# Patient Record
Sex: FEMALE | Race: WHITE | NOT HISPANIC OR LATINO | Employment: FULL TIME | URBAN - METROPOLITAN AREA
[De-identification: names, ages, dates, MRNs, and addresses within clinical notes are randomized per-mention and may not be internally consistent; named-entity substitution may affect disease eponyms.]

---

## 2023-04-18 ENCOUNTER — OFFICE VISIT (OUTPATIENT)
Dept: PRIMARY CARE | Facility: CLINIC | Age: 28
End: 2023-04-18
Payer: COMMERCIAL

## 2023-04-18 VITALS
TEMPERATURE: 98 F | HEART RATE: 86 BPM | BODY MASS INDEX: 34.09 KG/M2 | SYSTOLIC BLOOD PRESSURE: 135 MMHG | WEIGHT: 212.1 LBS | DIASTOLIC BLOOD PRESSURE: 86 MMHG | HEIGHT: 66 IN

## 2023-04-18 DIAGNOSIS — I10 HYPERTENSION, UNSPECIFIED TYPE: ICD-10-CM

## 2023-04-18 DIAGNOSIS — Z00.00 PHYSICAL EXAM: Primary | ICD-10-CM

## 2023-04-18 PROCEDURE — 1036F TOBACCO NON-USER: CPT | Performed by: NURSE PRACTITIONER

## 2023-04-18 PROCEDURE — 3075F SYST BP GE 130 - 139MM HG: CPT | Performed by: NURSE PRACTITIONER

## 2023-04-18 PROCEDURE — 99395 PREV VISIT EST AGE 18-39: CPT | Performed by: NURSE PRACTITIONER

## 2023-04-18 PROCEDURE — 3079F DIAST BP 80-89 MM HG: CPT | Performed by: NURSE PRACTITIONER

## 2023-04-18 RX ORDER — HYDROCHLOROTHIAZIDE 25 MG/1
25 TABLET ORAL
COMMUNITY
Start: 2023-03-15 | End: 2023-04-18 | Stop reason: SDUPTHER

## 2023-04-18 RX ORDER — HYDROCHLOROTHIAZIDE 25 MG/1
25 TABLET ORAL
Qty: 90 TABLET | Refills: 1 | Status: SHIPPED | OUTPATIENT
Start: 2023-04-18 | End: 2023-10-09

## 2023-04-18 ASSESSMENT — PATIENT HEALTH QUESTIONNAIRE - PHQ9
SUM OF ALL RESPONSES TO PHQ9 QUESTIONS 1 AND 2: 0
2. FEELING DOWN, DEPRESSED OR HOPELESS: NOT AT ALL
1. LITTLE INTEREST OR PLEASURE IN DOING THINGS: NOT AT ALL

## 2023-04-18 NOTE — PROGRESS NOTES
"Subjective   Patient ID: Yael Govea is a 27 y.o. female who presents for Hypertension ([facility] care - discuss BP).    HPI   Patient presents to clinic to establish care.  Patient is a nurse at [employer].      She tells me she was last seen by an online PCP approximately 1 month ago who started her on hydrochlorothiazide for her blood pressure.  She stated blood pressures have been ranging systolic 150-160s.  She states when she started on a blood pressure medication they range in the 130s.         She tells me generally she has been feeling well-She is being treated for plantar fasciitis receiving injections from podiatry.  States she continues to have some pain in the left foot.  She is doing some treatments at home as well.    Appetite normal bowel and bladder normal    Review of Systems   Cardiovascular:  Negative for chest pain.   Musculoskeletal:         See HPI   Neurological:  Negative for dizziness and headaches.   All other systems reviewed and are negative.      Objective   /86 (BP Location: Left arm, Patient Position: Sitting, BP Cuff Size: Large adult)   Pulse 86   Temp 36.7 °C (98 °F) (Temporal)   Ht 1.676 m (5' 6\")   Wt 96.2 kg (212 lb 1.6 oz)   BMI 34.23 kg/m²     Physical Exam  Constitutional:       Appearance: Normal appearance.   HENT:      Right Ear: Tympanic membrane and external ear normal.      Left Ear: Tympanic membrane and external ear normal.      Mouth/Throat:      Mouth: Mucous membranes are moist.   Eyes:      Pupils: Pupils are equal, round, and reactive to light.   Cardiovascular:      Rate and Rhythm: Normal rate and regular rhythm.   Pulmonary:      Effort: Pulmonary effort is normal.      Breath sounds: Normal breath sounds.   Abdominal:      General: Bowel sounds are normal.      Palpations: Abdomen is soft.   Musculoskeletal:         General: Normal range of motion.      Cervical back: Normal range of motion and neck supple.   Skin:     General: Skin is warm and " dry.   Neurological:      Mental Status: She is alert and oriented to person, place, and time.   Psychiatric:         Mood and Affect: Mood normal.         Assessment/Plan   Problem List Items Addressed This Visit    None  Visit Diagnoses       Physical exam    -  Primary    Relevant Medications        Other Relevant Orders    CBC    Comprehensive Metabolic Panel    Lipid Panel    Hemoglobin A1C    Tsh With Reflex To Free T4 If Abnormal    Vitamin D 25-Hydroxy,Total    Hypertension, unspecified type               hydroCHLOROthiazide (HYDRODiuril) 25 mg tablet          Continue to check Blood pressures at home.         Healthy low sodium diet

## 2023-04-18 NOTE — PATIENT INSTRUCTIONS
Continue medications as prescribed.  Healthy diet and drink plenty of water.  Please have labs drawn.  My CHART for results will call with abnormal results only.  Please schedule all necessary health screenings ophthalmology and dentist.    Follow-up in 1 YEAR  Call office any new concerns.

## 2023-04-19 ASSESSMENT — ENCOUNTER SYMPTOMS
HEADACHES: 0
DIZZINESS: 0

## 2023-04-28 ENCOUNTER — LAB (OUTPATIENT)
Dept: LAB | Facility: LAB | Age: 28
End: 2023-04-28
Payer: COMMERCIAL

## 2023-04-28 DIAGNOSIS — Z00.00 PHYSICAL EXAM: ICD-10-CM

## 2023-04-28 LAB
ALANINE AMINOTRANSFERASE (SGPT) (U/L) IN SER/PLAS: 13 U/L (ref 7–45)
ALBUMIN (G/DL) IN SER/PLAS: 4.2 G/DL (ref 3.4–5)
ALKALINE PHOSPHATASE (U/L) IN SER/PLAS: 68 U/L (ref 33–110)
ANION GAP IN SER/PLAS: 12 MMOL/L (ref 10–20)
ASPARTATE AMINOTRANSFERASE (SGOT) (U/L) IN SER/PLAS: 11 U/L (ref 9–39)
BILIRUBIN TOTAL (MG/DL) IN SER/PLAS: 0.5 MG/DL (ref 0–1.2)
CALCIDIOL (25 OH VITAMIN D3) (NG/ML) IN SER/PLAS: 35 NG/ML
CALCIUM (MG/DL) IN SER/PLAS: 9 MG/DL (ref 8.6–10.3)
CARBON DIOXIDE, TOTAL (MMOL/L) IN SER/PLAS: 27 MMOL/L (ref 21–32)
CHLORIDE (MMOL/L) IN SER/PLAS: 104 MMOL/L (ref 98–107)
CHOLESTEROL (MG/DL) IN SER/PLAS: 178 MG/DL (ref 0–199)
CHOLESTEROL IN HDL (MG/DL) IN SER/PLAS: 41.4 MG/DL
CHOLESTEROL/HDL RATIO: 4.3
CREATININE (MG/DL) IN SER/PLAS: 0.78 MG/DL (ref 0.5–1.05)
ERYTHROCYTE DISTRIBUTION WIDTH (RATIO) BY AUTOMATED COUNT: 12.2 % (ref 11.5–14.5)
ERYTHROCYTE MEAN CORPUSCULAR HEMOGLOBIN CONCENTRATION (G/DL) BY AUTOMATED: 31.3 G/DL (ref 32–36)
ERYTHROCYTE MEAN CORPUSCULAR VOLUME (FL) BY AUTOMATED COUNT: 83 FL (ref 80–100)
ERYTHROCYTES (10*6/UL) IN BLOOD BY AUTOMATED COUNT: 5.39 X10E12/L (ref 4–5.2)
ESTIMATED AVERAGE GLUCOSE FOR HBA1C: 100 MG/DL
GFR FEMALE: >90 ML/MIN/1.73M2
GLUCOSE (MG/DL) IN SER/PLAS: 80 MG/DL (ref 74–99)
HEMATOCRIT (%) IN BLOOD BY AUTOMATED COUNT: 44.7 % (ref 36–46)
HEMOGLOBIN (G/DL) IN BLOOD: 14 G/DL (ref 12–16)
HEMOGLOBIN A1C/HEMOGLOBIN TOTAL IN BLOOD: 5.1 %
LDL: 109 MG/DL (ref 0–99)
LEUKOCYTES (10*3/UL) IN BLOOD BY AUTOMATED COUNT: 11.2 X10E9/L (ref 4.4–11.3)
NRBC (PER 100 WBCS) BY AUTOMATED COUNT: 0 /100 WBC (ref 0–0)
PLATELETS (10*3/UL) IN BLOOD AUTOMATED COUNT: 315 X10E9/L (ref 150–450)
POTASSIUM (MMOL/L) IN SER/PLAS: 4 MMOL/L (ref 3.5–5.3)
PROTEIN TOTAL: 7.5 G/DL (ref 6.4–8.2)
SODIUM (MMOL/L) IN SER/PLAS: 139 MMOL/L (ref 136–145)
THYROTROPIN (MIU/L) IN SER/PLAS BY DETECTION LIMIT <= 0.05 MIU/L: 5.37 MIU/L (ref 0.44–3.98)
THYROXINE (T4) FREE (NG/DL) IN SER/PLAS: 1.02 NG/DL (ref 0.61–1.12)
TRIGLYCERIDE (MG/DL) IN SER/PLAS: 137 MG/DL (ref 0–149)
UREA NITROGEN (MG/DL) IN SER/PLAS: 18 MG/DL (ref 6–23)
VLDL: 27 MG/DL (ref 0–40)

## 2023-04-28 PROCEDURE — 82306 VITAMIN D 25 HYDROXY: CPT

## 2023-04-28 PROCEDURE — 84443 ASSAY THYROID STIM HORMONE: CPT

## 2023-04-28 PROCEDURE — 84439 ASSAY OF FREE THYROXINE: CPT

## 2023-04-28 PROCEDURE — 80061 LIPID PANEL: CPT

## 2023-04-28 PROCEDURE — 36415 COLL VENOUS BLD VENIPUNCTURE: CPT

## 2023-04-28 PROCEDURE — 80053 COMPREHEN METABOLIC PANEL: CPT

## 2023-04-28 PROCEDURE — 85027 COMPLETE CBC AUTOMATED: CPT

## 2023-04-28 PROCEDURE — 83036 HEMOGLOBIN GLYCOSYLATED A1C: CPT

## 2023-05-01 ENCOUNTER — TELEPHONE (OUTPATIENT)
Dept: PRIMARY CARE | Facility: CLINIC | Age: 28
End: 2023-05-01
Payer: COMMERCIAL

## 2023-05-01 DIAGNOSIS — R79.89 ELEVATED TSH: Primary | ICD-10-CM

## 2023-05-01 NOTE — TELEPHONE ENCOUNTER
Phoned patient to discuss lab results.  TSH level elevated at 5.37 T4 is normal.  We will recheck level in 6 weeks.  Also cholesterol level lipid panel mildly elevated advised healthy lifestyle changes with diet exercises.

## 2023-06-13 ENCOUNTER — LAB (OUTPATIENT)
Dept: LAB | Facility: LAB | Age: 28
End: 2023-06-13
Payer: COMMERCIAL

## 2023-06-13 DIAGNOSIS — R79.89 ELEVATED TSH: ICD-10-CM

## 2023-06-13 LAB — THYROTROPIN (MIU/L) IN SER/PLAS BY DETECTION LIMIT <= 0.05 MIU/L: 3.44 MIU/L (ref 0.44–3.98)

## 2023-06-13 PROCEDURE — 36415 COLL VENOUS BLD VENIPUNCTURE: CPT

## 2023-06-13 PROCEDURE — 84443 ASSAY THYROID STIM HORMONE: CPT

## 2023-06-14 ENCOUNTER — TELEPHONE (OUTPATIENT)
Dept: PRIMARY CARE | Facility: CLINIC | Age: 28
End: 2023-06-14
Payer: COMMERCIAL

## 2023-06-14 NOTE — TELEPHONE ENCOUNTER
----- Message from KIM Lozoya sent at 6/14/2023  9:32 AM EDT -----  Regarding: Lab  Please notify patient that TSH level is back within normal range.  ----- Message -----  From: Lab, Background User  Sent: 6/13/2023   4:03 PM EDT  To: KIM Lozoya

## 2023-09-26 ENCOUNTER — LAB (OUTPATIENT)
Dept: LAB | Facility: LAB | Age: 28
End: 2023-09-26
Payer: COMMERCIAL

## 2023-09-26 LAB — TOBACCO SCREEN, URINE: NEGATIVE

## 2023-11-30 ENCOUNTER — LAB REQUISITION (OUTPATIENT)
Dept: LAB | Facility: HOSPITAL | Age: 28
End: 2023-11-30
Payer: COMMERCIAL

## 2023-11-30 DIAGNOSIS — U07.1 COVID-19: ICD-10-CM

## 2023-11-30 LAB — SARS-COV-2 RNA RESP QL NAA+PROBE: NOT DETECTED

## 2023-11-30 PROCEDURE — 87635 SARS-COV-2 COVID-19 AMP PRB: CPT

## 2024-03-08 ENCOUNTER — OFFICE VISIT (OUTPATIENT)
Dept: OBSTETRICS AND GYNECOLOGY | Facility: CLINIC | Age: 29
End: 2024-03-08
Payer: COMMERCIAL

## 2024-03-08 VITALS
HEIGHT: 66 IN | BODY MASS INDEX: 33.91 KG/M2 | WEIGHT: 211 LBS | DIASTOLIC BLOOD PRESSURE: 88 MMHG | SYSTOLIC BLOOD PRESSURE: 136 MMHG

## 2024-03-08 DIAGNOSIS — Z31.9 DESIRE FOR PREGNANCY: ICD-10-CM

## 2024-03-08 DIAGNOSIS — Z01.419 ENCOUNTER FOR ANNUAL ROUTINE GYNECOLOGICAL EXAMINATION: Primary | ICD-10-CM

## 2024-03-08 DIAGNOSIS — Z12.4 CERVICAL CANCER SCREENING: ICD-10-CM

## 2024-03-08 PROCEDURE — 88175 CYTOPATH C/V AUTO FLUID REDO: CPT

## 2024-03-08 PROCEDURE — 87661 TRICHOMONAS VAGINALIS AMPLIF: CPT

## 2024-03-08 PROCEDURE — 99385 PREV VISIT NEW AGE 18-39: CPT | Performed by: OBSTETRICS & GYNECOLOGY

## 2024-03-08 PROCEDURE — 1036F TOBACCO NON-USER: CPT | Performed by: OBSTETRICS & GYNECOLOGY

## 2024-03-08 PROCEDURE — 87800 DETECT AGNT MULT DNA DIREC: CPT

## 2024-03-08 ASSESSMENT — PAIN SCALES - GENERAL: PAINLEVEL: 0-NO PAIN

## 2024-03-08 NOTE — PROGRESS NOTES
"Assessment     PLAN  1. Encounter for annual routine gynecological examination    2. Cervical cancer screening  - THINPREP PAP TEST    3. Desire for pregnancy  - trying to conceive for about 6 months now. Seems ovulatory. No red flags for infertility. discussed timed intercourse. Discussed referral to [facility] at 12 months if not successful.     Please return for your next visit in 1 year or sooner as needed.    Abena Green MD      Subjective     Yael Schmidt is a 28 y.o. female who is here for a routine exam.   PCP = Evie Resendiz, APRN-CNP    APE Concerns: Trying to conceive since 10/2024, +OPK    History of chronic HTN - on hydrochlorothiazide in the past. It was discontinued a few years ago due to low normal Bps. She checks BP daily at home and reports that they are always normal.     Gynecologic History:    OBHx: G0  Menses: regular, heavy, duration 5 days  Last Pap: reports normal about 3 years ago  HPV Vaccine: completed series  History of Dysplasia: Denies  Sexually active: active with   STI testing: accepts GCT  Contraception: Stopped OCP 3/2023  Mammogram: Not indicated  FamHx of GYN cancers:  Denies      PMH, PSH, FH, SH, medications and allergies reviewed and edited as needed.      Objective   /88   Ht 1.676 m (5' 6\")   Wt 95.7 kg (211 lb)   LMP 02/18/2024   BMI 34.06 kg/m²      General:   Alert and oriented, in no acute distress   Neck: Supple. No visible thyromegaly.    Breast/Axilla: Normal to palpation bilaterally without masses, skin changes, or nipple discharge.    Abdomen: Soft, non-tender, without masses or organomegaly   Vulva: Normal architecture without erythema, masses, or lesions.    Vagina: Normal mucosa without lesions, masses, or atrophy. No abnormal vaginal discharge.    Cervix: Normal without masses, lesions, or signs of cervicitis.    Uterus: Normal mobile, non-enlarged uterus    Adnexa: Normal without masses or lesions   Pelvic Floor No POP " noted. No high tone pelvic floor   Psych Normal affect. Normal mood.

## 2024-03-08 NOTE — PROGRESS NOTES
New Patient Visit. Pt states she is having bleeding since stopping OCPs. Have been actively trying to conceive within the last year    LAST PAP: Per pt ( 3 yrs ago ) WNL    LAST MAMM: N/A

## 2024-03-12 LAB
C TRACH RRNA SPEC QL NAA+PROBE: NEGATIVE
N GONORRHOEA DNA SPEC QL PROBE+SIG AMP: NEGATIVE
T VAGINALIS RRNA SPEC QL NAA+PROBE: NEGATIVE

## 2024-03-21 LAB
CYTOLOGY CMNT CVX/VAG CYTO-IMP: NORMAL
LAB AP HPV GENOTYPE QUESTION: NO
LAB AP HPV HR: NORMAL
LAB AP PAP ADDITIONAL TESTS: NORMAL
LABORATORY COMMENT REPORT: NORMAL
LMP START DATE: NORMAL
PATH REPORT.TOTAL CANCER: NORMAL

## 2024-06-11 ENCOUNTER — TELEPHONE (OUTPATIENT)
Dept: PRIMARY CARE | Facility: CLINIC | Age: 29
End: 2024-06-11
Payer: COMMERCIAL

## 2024-06-11 NOTE — TELEPHONE ENCOUNTER
Evie Resendiz is no longer in Family medicine Patient needs to schedule with a new PCP to move forward with the request. Please call [phone] or the scheduling line @ [phone] .

## 2024-06-11 NOTE — TELEPHONE ENCOUNTER
----- Message from Mariya Luo RN sent at 6/11/2024 10:46 AM EDT -----  Regarding: FW: Medication request  Contact: [phone]  Are you able to assist this pt with her request?     Thank you!   ----- Message -----  From: Tere Bates  Sent: 6/11/2024  10:44 AM EDT  To: DAPHNE Lozoya-CNP; #  Subject: FW: Medication request                             ----- Message -----  From: Yael Schmidt  Sent: 6/9/2024  12:06 PM EDT  To: [facility] Rn Care Coordinator  Subject: Medication request                               Good morning,     I wanted to reach out and see if you think a semiglutide would be an option that you recommend for weight management. 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     Let me know if this is something you have experience with or if you think I should see someone that specializes in weight management

## 2024-06-11 NOTE — LETTER
June 11, 2024      Good morning, Yael Schmidt unfortunately Evie Resendiz is no longer in Family medicine Patient needs to schedule with a new PCP to move forward with the request. Please call [phone] or the scheduling line @ [phone]       Thank you,      MORA NORTON LPN

## 2024-07-26 ENCOUNTER — TELEPHONE (OUTPATIENT)
Dept: OBSTETRICS AND GYNECOLOGY | Facility: CLINIC | Age: 29
End: 2024-07-26
Payer: COMMERCIAL

## 2024-07-26 NOTE — TELEPHONE ENCOUNTER
Called patient. Patient sent a [employer]ost[employer] message stating she had a positive pregnancy test. Patient LMP was July 5th. Patient scheduled for a new ob appointment. Patient educated on concerning symptoms to watch out for/when to call the office. Patient has no questions or concerns at this time.

## 2024-08-05 ENCOUNTER — APPOINTMENT (OUTPATIENT)
Dept: OBSTETRICS AND GYNECOLOGY | Facility: CLINIC | Age: 29
End: 2024-08-05
Payer: COMMERCIAL

## 2024-08-22 ENCOUNTER — APPOINTMENT (OUTPATIENT)
Dept: OBSTETRICS AND GYNECOLOGY | Facility: CLINIC | Age: 29
End: 2024-08-22
Payer: COMMERCIAL

## 2024-08-30 ENCOUNTER — APPOINTMENT (OUTPATIENT)
Dept: OBSTETRICS AND GYNECOLOGY | Facility: CLINIC | Age: 29
End: 2024-08-30
Payer: COMMERCIAL

## 2024-08-30 ENCOUNTER — LAB (OUTPATIENT)
Dept: LAB | Facility: LAB | Age: 29
End: 2024-08-30
Payer: COMMERCIAL

## 2024-08-30 VITALS — BODY MASS INDEX: 34.38 KG/M2 | DIASTOLIC BLOOD PRESSURE: 78 MMHG | SYSTOLIC BLOOD PRESSURE: 104 MMHG | WEIGHT: 213 LBS

## 2024-08-30 DIAGNOSIS — Z3A.09 9 WEEKS GESTATION OF PREGNANCY (HHS-HCC): Primary | ICD-10-CM

## 2024-08-30 DIAGNOSIS — Z3A.09 9 WEEKS GESTATION OF PREGNANCY (HHS-HCC): ICD-10-CM

## 2024-08-30 DIAGNOSIS — O10.919 CHRONIC HYPERTENSION IN PREGNANCY (HHS-HCC): ICD-10-CM

## 2024-08-30 LAB
ABO GROUP (TYPE) IN BLOOD: NORMAL
ALBUMIN SERPL BCP-MCNC: 3.8 G/DL (ref 3.4–5)
ALP SERPL-CCNC: 63 U/L (ref 33–110)
ALT SERPL W P-5'-P-CCNC: 11 U/L (ref 7–45)
ANION GAP SERPL CALC-SCNC: 15 MMOL/L (ref 10–20)
ANTIBODY SCREEN: NORMAL
AST SERPL W P-5'-P-CCNC: 9 U/L (ref 9–39)
BILIRUB SERPL-MCNC: 0.4 MG/DL (ref 0–1.2)
BUN SERPL-MCNC: 10 MG/DL (ref 6–23)
CALCIUM SERPL-MCNC: 8.8 MG/DL (ref 8.6–10.6)
CHLORIDE SERPL-SCNC: 103 MMOL/L (ref 98–107)
CO2 SERPL-SCNC: 22 MMOL/L (ref 21–32)
CREAT SERPL-MCNC: 0.57 MG/DL (ref 0.5–1.05)
CREAT UR-MCNC: 114.2 MG/DL (ref 20–320)
EGFRCR SERPLBLD CKD-EPI 2021: >90 ML/MIN/1.73M*2
ERYTHROCYTE [DISTWIDTH] IN BLOOD BY AUTOMATED COUNT: 12.5 % (ref 11.5–14.5)
EST. AVERAGE GLUCOSE BLD GHB EST-MCNC: 94 MG/DL
GLUCOSE SERPL-MCNC: 74 MG/DL (ref 74–99)
HBA1C MFR BLD: 4.9 %
HBV CORE AB SER QL: NONREACTIVE
HBV SURFACE AB SER-ACNC: <3.1 MIU/ML
HBV SURFACE AG SERPL QL IA: NONREACTIVE
HCT VFR BLD AUTO: 40.5 % (ref 36–46)
HCV AB SER QL: NONREACTIVE
HGB BLD-MCNC: 13 G/DL (ref 12–16)
HIV 1+2 AB+HIV1 P24 AG SERPL QL IA: NONREACTIVE
LDH SERPL L TO P-CCNC: 73 U/L (ref 84–246)
MCH RBC QN AUTO: 26.2 PG (ref 26–34)
MCHC RBC AUTO-ENTMCNC: 32.1 G/DL (ref 32–36)
MCV RBC AUTO: 82 FL (ref 80–100)
NRBC BLD-RTO: 0 /100 WBCS (ref 0–0)
PLATELET # BLD AUTO: 325 X10*3/UL (ref 150–450)
POTASSIUM SERPL-SCNC: 4 MMOL/L (ref 3.5–5.3)
PROT SERPL-MCNC: 7.5 G/DL (ref 6.4–8.2)
PROT UR-ACNC: 7 MG/DL (ref 5–24)
PROT/CREAT UR: 0.06 MG/MG CREAT (ref 0–0.17)
RBC # BLD AUTO: 4.97 X10*6/UL (ref 4–5.2)
REFLEX ADDED, ANEMIA PANEL: NORMAL
RH FACTOR (ANTIGEN D): NORMAL
RUBV IGG SERPL IA-ACNC: 1.3 IA
RUBV IGG SERPL QL IA: POSITIVE
SODIUM SERPL-SCNC: 136 MMOL/L (ref 136–145)
TREPONEMA PALLIDUM IGG+IGM AB [PRESENCE] IN SERUM OR PLASMA BY IMMUNOASSAY: NONREACTIVE
URATE SERPL-MCNC: 5.4 MG/DL (ref 2.3–6.7)
WBC # BLD AUTO: 10.6 X10*3/UL (ref 4.4–11.3)

## 2024-08-30 PROCEDURE — 86850 RBC ANTIBODY SCREEN: CPT

## 2024-08-30 PROCEDURE — 84550 ASSAY OF BLOOD/URIC ACID: CPT

## 2024-08-30 PROCEDURE — 87086 URINE CULTURE/COLONY COUNT: CPT

## 2024-08-30 PROCEDURE — 87340 HEPATITIS B SURFACE AG IA: CPT

## 2024-08-30 PROCEDURE — 86803 HEPATITIS C AB TEST: CPT

## 2024-08-30 PROCEDURE — 86780 TREPONEMA PALLIDUM: CPT

## 2024-08-30 PROCEDURE — 86900 BLOOD TYPING SEROLOGIC ABO: CPT

## 2024-08-30 PROCEDURE — 81329 SMN1 GENE DOS/DELETION ALYS: CPT

## 2024-08-30 PROCEDURE — 81220 CFTR GENE COM VARIANTS: CPT

## 2024-08-30 PROCEDURE — G0452 MOLECULAR PATHOLOGY INTERPR: HCPCS | Performed by: PATHOLOGY

## 2024-08-30 PROCEDURE — 87591 N.GONORRHOEAE DNA AMP PROB: CPT

## 2024-08-30 PROCEDURE — 86704 HEP B CORE ANTIBODY TOTAL: CPT

## 2024-08-30 PROCEDURE — 80053 COMPREHEN METABOLIC PANEL: CPT

## 2024-08-30 PROCEDURE — 87491 CHLMYD TRACH DNA AMP PROBE: CPT

## 2024-08-30 PROCEDURE — G0452 MOLECULAR PATHOLOGY INTERPR: HCPCS | Performed by: OBSTETRICS & GYNECOLOGY

## 2024-08-30 PROCEDURE — 83036 HEMOGLOBIN GLYCOSYLATED A1C: CPT

## 2024-08-30 PROCEDURE — 86901 BLOOD TYPING SEROLOGIC RH(D): CPT

## 2024-08-30 PROCEDURE — 0500F INITIAL PRENATAL CARE VISIT: CPT | Performed by: OBSTETRICS & GYNECOLOGY

## 2024-08-30 PROCEDURE — 86317 IMMUNOASSAY INFECTIOUS AGENT: CPT

## 2024-08-30 PROCEDURE — 83615 LACTATE (LD) (LDH) ENZYME: CPT

## 2024-08-30 PROCEDURE — 81243 FMR1 GEN ALY DETC ABNL ALLEL: CPT

## 2024-08-30 PROCEDURE — 84156 ASSAY OF PROTEIN URINE: CPT

## 2024-08-30 PROCEDURE — 36415 COLL VENOUS BLD VENIPUNCTURE: CPT

## 2024-08-30 PROCEDURE — 83021 HEMOGLOBIN CHROMOTOGRAPHY: CPT

## 2024-08-30 PROCEDURE — 85027 COMPLETE CBC AUTOMATED: CPT

## 2024-08-30 PROCEDURE — 87389 HIV-1 AG W/HIV-1&-2 AB AG IA: CPT

## 2024-08-30 PROCEDURE — 83020 HEMOGLOBIN ELECTROPHORESIS: CPT | Performed by: OBSTETRICS & GYNECOLOGY

## 2024-08-30 PROCEDURE — 86706 HEP B SURFACE ANTIBODY: CPT

## 2024-08-30 PROCEDURE — 82570 ASSAY OF URINE CREATININE: CPT

## 2024-08-30 ASSESSMENT — EDINBURGH POSTNATAL DEPRESSION SCALE (EPDS)
I HAVE FELT SAD OR MISERABLE: NO, NOT AT ALL
I HAVE FELT SCARED OR PANICKY FOR NO GOOD REASON: NO, NOT AT ALL
THINGS HAVE BEEN GETTING ON TOP OF ME: NO, I HAVE BEEN COPING AS WELL AS EVER
I HAVE BEEN ABLE TO LAUGH AND SEE THE FUNNY SIDE OF THINGS: AS MUCH AS I ALWAYS COULD
I HAVE BEEN ANXIOUS OR WORRIED FOR NO GOOD REASON: NO, NOT AT ALL
I HAVE LOOKED FORWARD WITH ENJOYMENT TO THINGS: AS MUCH AS I EVER DID
I HAVE BEEN SO UNHAPPY THAT I HAVE BEEN CRYING: NO, NEVER
I HAVE BLAMED MYSELF UNNECESSARILY WHEN THINGS WENT WRONG: NO, NEVER
I HAVE BEEN SO UNHAPPY THAT I HAVE HAD DIFFICULTY SLEEPING: NOT AT ALL
TOTAL SCORE: 0
THE THOUGHT OF HARMING MYSELF HAS OCCURRED TO ME: NEVER

## 2024-08-30 NOTE — PROGRESS NOTES
NEW OB VISIT    Assessment/Plan    Problem List Items Addressed This Visit       9 weeks gestation of pregnancy ([facility]) - Primary    Overview     Desired provider in labor: [] CNM  [x] Physician  [x] Blood Products: [x] Yes, accepts [] No, needs counseling  [x] Initial BMI: Could not be calculated   [] Prenatal Labs: ordered  [x] Cervical Cancer Screening up to date  [] Rh status:   [] Genetic Screening:  desires CF/SMA/fragile X and cfDNA  [] NT US: (11-13 wks)  [] Baby ASA (if indicated): Indicated, remind to start at NV  [] Pregnancy dated by:     [] Anatomy US: (19-20 wks)  [] Federal Sterilization consent signed (if indicated):  [] 1hr GCT at 24-28wks:  [] Rhogam (if indicated):   [] Fetal Surveillance (if indicated):  [] Tdap (27-32 wks, may be given up to 36 wks if initial window missed):   [] RSV (32-36 wks) (Sept. to end of Jan):   [] Flu Vaccine:    [] Breastfeeding:  [] Postpartum Birth control method:   [] GBS at 36 - 37 wks:  [] 39 weeks discussion of IOL vs. Expectant management:  [] Mode of delivery ( anticipated ):           Relevant Orders    C. Trachomatis / N. Gonorrhoeae, Amplified Detection    CBC Anemia Panel With Reflex,Pregnancy    Hemoglobin A1C    Hemoglobin Identification with Path Review    Hepatitis B Core Antibody, Total    Hepatitis B surface Ag    Hepatitis B Surface Antibody    Hepatitis C Antibody    HIV 1/2 Antigen/Antibody Screen with Reflex to Confirmation    Rubella IgG    Syphilis Screen with Reflex    Type And Screen    Urine culture    Cystic Fibrosis Carrier Screening    Fragile X Analysis    Spinal Muscular Atrophy Carrier Screening    Myriad Prequel Prenatal Screen    US MAC OB imaging order    Comprehensive Metabolic Panel    Lactate Dehydrogenase    Protein, Urine Random    Uric Acid    Chronic hypertension in pregnancy ([facility])    Overview     Not on meds  Monitoring Bps at home.   Baseline PEC labs ordered           Routine care  -Prenatal labs ordered, dating  ultrasound ordered  -OB education provided. Oriented to  OB/GYN practice  -Recommended vaccines (COVID-19, flu).  -Clinical risk assessment for preeclampsia: high risk, ASA indicated     Genetic counseling  -Patient was counseled regarding risks and benefits of serum screening for genetic disorders. After discussion, patient desires CF/SMA/Fragile X  -Patient was also counseled about options for Down's syndrome and other aneuploidy screening. After discussion, patient desires cf DNA  -NT discussed. Advised anatomy ultrasound at 18-20 weeks.     Pregnancy: Rodriguez     Delivery Plans  Planned delivery location: [facility]  Acceptable blood products: All     Post-Delivery Plans  Feeding intentions: plans to breastfeed     Follow up in 4 weeks or sooner as needed    Abena Green MD        Subjective     Yael Schmidt is a 29 y.o.  at 9w0d by LMP who presents for an initial prenatal visit.     Previously on hydrochlorothiazide for cHTN. Only on it for a few weeks and Bps low so discontinued. 2 years ago. Checks Bps at home    Nausea, rare vomiting    OB Hx: First pregnancy  Past Medical Hx: cHTN  Past Surgical Hx: Removal of wisdom teeth  Social Hx: Denies tobacco, alcohol, drugs  Family Hx: Non contributory, works in [employer] OR  Medications: PNV  Allergies: Doxycycline (GI upset)  Pap Hx: NILM 3/2024 (<31yo), repeat in 3 years      Physical Exam  Objective   /78   Wt 96.6 kg (213 lb)   LMP 2024 (Approximate)   BMI 34.38 kg/m²      General:   Alert and oriented, in no acute distress

## 2024-08-31 LAB
C TRACH RRNA SPEC QL NAA+PROBE: NEGATIVE
N GONORRHOEA DNA SPEC QL PROBE+SIG AMP: NEGATIVE

## 2024-09-01 LAB — BACTERIA UR CULT: NO GROWTH

## 2024-09-03 LAB
HEMOGLOBIN A2: 2.7 % (ref 2–3.5)
HEMOGLOBIN A: 96.9 % (ref 95.8–98)
HEMOGLOBIN F: 0.4 % (ref 0–2)
HEMOGLOBIN IDENTIFICATION INTERPRETATION: NORMAL
PATH REVIEW-HGB IDENTIFICATION: NORMAL

## 2024-09-06 LAB
ELECTRONICALLY SIGNED BY: NORMAL
SMA RESULT: NORMAL

## 2024-09-10 ENCOUNTER — LAB (OUTPATIENT)
Dept: LAB | Facility: LAB | Age: 29
End: 2024-09-10
Payer: COMMERCIAL

## 2024-09-10 LAB
ELECTRONICALLY SIGNED BY: NORMAL
FRAGILE X INTERPRETATION: NORMAL
FRAGILE X RESULT: NORMAL

## 2024-09-11 LAB
CFTR MUT ANL BLD/T: NORMAL
ELECTRONICALLY SIGNED BY: NORMAL

## 2024-09-16 ENCOUNTER — TELEPHONE (OUTPATIENT)
Dept: OBSTETRICS AND GYNECOLOGY | Facility: HOSPITAL | Age: 29
End: 2024-09-16
Payer: COMMERCIAL

## 2024-09-17 ENCOUNTER — HOSPITAL ENCOUNTER (OUTPATIENT)
Dept: RADIOLOGY | Facility: CLINIC | Age: 29
Discharge: HOME | End: 2024-09-17
Payer: COMMERCIAL

## 2024-09-17 DIAGNOSIS — Z3A.11 11 WEEKS GESTATION OF PREGNANCY (HHS-HCC): ICD-10-CM

## 2024-09-17 DIAGNOSIS — Z3A.09 9 WEEKS GESTATION OF PREGNANCY (HHS-HCC): ICD-10-CM

## 2024-09-17 DIAGNOSIS — O26.21 RECURRENT PREGNANCY LOSS IN PREGNANT PATIENT IN FIRST TRIMESTER, ANTEPARTUM (HHS-HCC): Primary | ICD-10-CM

## 2024-09-17 PROCEDURE — 76801 OB US < 14 WKS SINGLE FETUS: CPT | Performed by: OBSTETRICS & GYNECOLOGY

## 2024-09-17 PROCEDURE — 76801 OB US < 14 WKS SINGLE FETUS: CPT

## 2024-09-17 NOTE — TELEPHONE ENCOUNTER
Called today with two spots of blood on the toilet paper, about half of a sheet of toilet paper on two occasions in the last hour. Some cramping. Discussed she could come to the ED, or be seen tomorrow morning. Patient would like to be seen in the morning.

## 2024-09-18 ENCOUNTER — TELEPHONE (OUTPATIENT)
Dept: OBSTETRICS AND GYNECOLOGY | Facility: CLINIC | Age: 29
End: 2024-09-18
Payer: COMMERCIAL

## 2024-09-18 NOTE — TELEPHONE ENCOUNTER
Called patient to follow up on bleeding. Patient denies bleeding today and abdominal cramping. Patient stated she has pain but it feels more like a pulling. Patient educated/informed to call the office if any concerning symptoms arise. Patient verbalized understanding.  ----- Message from Laverne Ring sent at 2024 12:19 AM EDT -----  Regardin week with bleeding  This patient had some bleeding overnight. I would like her to get an ultrasound today if possible.

## 2024-09-21 LAB
COMMENTS - MP RESULT TYPE: NORMAL
SCAN RESULT: NORMAL

## 2024-09-27 ENCOUNTER — HOSPITAL ENCOUNTER (OUTPATIENT)
Dept: RADIOLOGY | Facility: CLINIC | Age: 29
Discharge: HOME | End: 2024-09-27
Payer: COMMERCIAL

## 2024-09-27 ENCOUNTER — APPOINTMENT (OUTPATIENT)
Dept: OBSTETRICS AND GYNECOLOGY | Facility: CLINIC | Age: 29
End: 2024-09-27
Payer: COMMERCIAL

## 2024-09-27 ENCOUNTER — APPOINTMENT (OUTPATIENT)
Dept: RADIOLOGY | Facility: CLINIC | Age: 29
End: 2024-09-27
Payer: COMMERCIAL

## 2024-09-27 VITALS — BODY MASS INDEX: 34.7 KG/M2 | DIASTOLIC BLOOD PRESSURE: 84 MMHG | SYSTOLIC BLOOD PRESSURE: 122 MMHG | WEIGHT: 215 LBS

## 2024-09-27 DIAGNOSIS — Z3A.09 9 WEEKS GESTATION OF PREGNANCY (HHS-HCC): ICD-10-CM

## 2024-09-27 DIAGNOSIS — O10.919 CHRONIC HYPERTENSION IN PREGNANCY (HHS-HCC): ICD-10-CM

## 2024-09-27 DIAGNOSIS — Z3A.13 13 WEEKS GESTATION OF PREGNANCY (HHS-HCC): ICD-10-CM

## 2024-09-27 PROCEDURE — 76816 OB US FOLLOW-UP PER FETUS: CPT

## 2024-09-27 NOTE — PROGRESS NOTES
Ob Follow-up  24     SUBJECTIVE      HPI: Yael Schmidt is a 29 y.o.  at 13w0d here for RPNV.    Patient had an episode of bleeding that lasted about 2 days. She went in for an urgent ultrasound that was normal. Bleeding has resolved.   Patient reports increased nose bleedings - using vaseline. Sacral soreness       OBJECTIVE  Visit Vitals  /84   Wt 97.5 kg (215 lb)   LMP 2024 (Approximate)   BMI 34.70 kg/m²   OB Status Pregnant   Smoking Status Never   BSA 2.13 m²          ASSESSMENT & PLAN    Yael Schmidt is a 29 y.o.  at 13w0d here for the following concerns we addressed today:    Problem List Items Addressed This Visit       13 weeks gestation of pregnancy ([facility])    Overview     Desired provider in labor: [] CNM  [x] Physician  [x] Blood Products: [x] Yes, accepts [] No, needs counseling  [] Initial BMI:   [x] Prenatal Labs: O+, RI, Hep B non immune (Declines vaccine), Hgb 13.0  [x] Cervical Cancer Screening up to date  [x] Rh status: positive  [x] Genetic Screening:  neg CF/SMA/fragile X and rr cfDNA  [] NT US: (11-13 wks): scheduled later today   [x] Baby ASA (if indicated): Indicated, started   [x] Pregnancy dated by: LMP consistend with an 11 week  ultrasound    [] Anatomy US: (19-20 wks)  [] Federal Sterilization consent signed (if indicated):  [] 1hr GCT at 24-28wks:  [] Rhogam (if indicated):   [] Fetal Surveillance (if indicated):  [] Tdap (27-32 wks, may be given up to 36 wks if initial window missed):   [] RSV (32-36 wks) (Sept. to end of ):   [x] Flu Vaccine: discussed 24 and declines    [] Breastfeeding:  [] Postpartum Birth control method:   [] GBS at 36 - 37 wks:  [] 39 weeks discussion of IOL vs. Expectant management:  [] Mode of delivery ( anticipated ):           Chronic hypertension in pregnancy ([facility])    Overview     Not on meds  Monitoring Bps at home   Baseline PEC labs normal              RTC in 4 weeks      Abena Green MD

## 2024-10-21 ENCOUNTER — TELEPHONE (OUTPATIENT)
Dept: OBSTETRICS AND GYNECOLOGY | Facility: CLINIC | Age: 29
End: 2024-10-21
Payer: COMMERCIAL

## 2024-10-21 NOTE — TELEPHONE ENCOUNTER
Called patient. Patient current symptoms are cough and unable to speak. Patient does not have taste or smell but has been eating small meals and protein shakes.  Patient would like something to help with the cough. Patient advised she can take robitussin.     Discussed with patient when to call the office if symptoms worsening or patient has concerning symptoms. Patient verbalized understanding.

## 2024-10-24 ENCOUNTER — APPOINTMENT (OUTPATIENT)
Dept: OBSTETRICS AND GYNECOLOGY | Facility: CLINIC | Age: 29
End: 2024-10-24
Payer: COMMERCIAL

## 2024-10-24 VITALS — SYSTOLIC BLOOD PRESSURE: 132 MMHG | BODY MASS INDEX: 34.22 KG/M2 | DIASTOLIC BLOOD PRESSURE: 76 MMHG | WEIGHT: 212 LBS

## 2024-10-24 DIAGNOSIS — O10.919 CHRONIC HYPERTENSION IN PREGNANCY (HHS-HCC): ICD-10-CM

## 2024-10-24 DIAGNOSIS — J01.90 ACUTE SINUSITIS, RECURRENCE NOT SPECIFIED, UNSPECIFIED LOCATION: ICD-10-CM

## 2024-10-24 DIAGNOSIS — O09.90 SUPERVISION OF HIGH RISK PREGNANCY, ANTEPARTUM (HHS-HCC): Primary | ICD-10-CM

## 2024-10-24 DIAGNOSIS — Z3A.16 16 WEEKS GESTATION OF PREGNANCY (HHS-HCC): ICD-10-CM

## 2024-10-24 PROCEDURE — 0501F PRENATAL FLOW SHEET: CPT | Performed by: OBSTETRICS & GYNECOLOGY

## 2024-10-24 RX ORDER — AMOXICILLIN 500 MG/1
500 TABLET, FILM COATED ORAL 2 TIMES DAILY
Qty: 14 TABLET | Refills: 0 | Status: SHIPPED | OUTPATIENT
Start: 2024-10-24 | End: 2024-10-31

## 2024-10-24 NOTE — PROGRESS NOTES
Ob Follow-up  10/24/24     SUBJECTIVE      HPI: Yael Schmidt is a 29 y.o.  at 16w6d here for RPNV.  Patient     Has been sick with URI symptoms for greater than 10 days. Pain over sinuses. Really miserable at night.    OBJECTIVE  Visit Vitals  /76   Wt 96.2 kg (212 lb)   LMP 2024 (Approximate)   BMI 34.22 kg/m²   OB Status Pregnant   Smoking Status Never   BSA 2.12 m²       ASSESSMENT & PLAN    Yael Schmidt is a 29 y.o.  at 16w6d here for the following concerns we addressed today:    Problem List Items Addressed This Visit       16 weeks gestation of pregnancy ([facility])    Overview     Desired provider in labor: [] CNM  [x] Physician  [x] Blood Products: [x] Yes, accepts [] No, needs counseling  [x] Initial BMI: 34  [x] Prenatal Labs: O+, RI, Hep B non immune (Declines vaccine), Hgb 13.0  [x] Cervical Cancer Screening up to date  [x] Rh status: positive  [x] Genetic Screening:  neg CF/SMA/fragile X and rr cfDNA  [x] NT US: (11-13 wks): no optimal views but no obvious abnormality  [x] Baby ASA (if indicated): Indicated, started   [x] Pregnancy dated by: LMP consistend with an 11 week  ultrasound    [] Anatomy US: (19-20 wks): scheduled  [] Federal Sterilization consent signed (if indicated):  [] 1hr GCT at 24-28wks:  [x] Rhogam (if indicated): Not indicated  [] Fetal Surveillance (if indicated):  [] Tdap (27-32 wks, may be given up to 36 wks if initial window missed):   [] RSV (32-36 wks) (Sept. to end of ):   [x] Flu Vaccine: discussed 24 and declines    [] Breastfeeding:  [] Postpartum Birth control method:   [] GBS at 36 - 37 wks:  [] 39 weeks discussion of IOL vs. Expectant management:  [] Mode of delivery ( anticipated ):           Chronic hypertension in pregnancy ([facility])    Overview     Not on meds  Monitoring Bps at home   Baseline PEC labs normal         Supervision of high risk pregnancy, antepartum ([facility]) - Primary    Acute sinusitis    Overview      Symptoms with minimal improvement after 10 days  Rx sent for amoxicillin         Relevant Medications    amoxicillin (Amoxil) 500 mg tablet       RTC in 4 weeks      Abena Green MD

## 2024-11-15 ENCOUNTER — APPOINTMENT (OUTPATIENT)
Dept: RADIOLOGY | Facility: CLINIC | Age: 29
End: 2024-11-15
Payer: COMMERCIAL

## 2024-11-22 ENCOUNTER — APPOINTMENT (OUTPATIENT)
Dept: OBSTETRICS AND GYNECOLOGY | Facility: CLINIC | Age: 29
End: 2024-11-22
Payer: COMMERCIAL

## 2024-11-22 ENCOUNTER — HOSPITAL ENCOUNTER (OUTPATIENT)
Dept: RADIOLOGY | Facility: CLINIC | Age: 29
Discharge: HOME | End: 2024-11-22
Payer: COMMERCIAL

## 2024-11-22 VITALS — SYSTOLIC BLOOD PRESSURE: 130 MMHG | BODY MASS INDEX: 34.86 KG/M2 | DIASTOLIC BLOOD PRESSURE: 72 MMHG | WEIGHT: 216 LBS

## 2024-11-22 DIAGNOSIS — O09.90 SUPERVISION OF HIGH RISK PREGNANCY, ANTEPARTUM (HHS-HCC): Primary | ICD-10-CM

## 2024-11-22 DIAGNOSIS — Z3A.11 11 WEEKS GESTATION OF PREGNANCY (HHS-HCC): ICD-10-CM

## 2024-11-22 DIAGNOSIS — Z3A.21 21 WEEKS GESTATION OF PREGNANCY (HHS-HCC): ICD-10-CM

## 2024-11-22 DIAGNOSIS — O10.919 CHRONIC HYPERTENSION IN PREGNANCY (HHS-HCC): ICD-10-CM

## 2024-11-22 PROBLEM — J01.90 ACUTE SINUSITIS: Status: RESOLVED | Noted: 2024-10-24 | Resolved: 2024-11-22

## 2024-11-22 PROCEDURE — 76811 OB US DETAILED SNGL FETUS: CPT

## 2024-11-22 PROCEDURE — 0501F PRENATAL FLOW SHEET: CPT | Performed by: OBSTETRICS & GYNECOLOGY

## 2024-11-22 NOTE — PROGRESS NOTES
Ob Follow-up  24     SUBJECTIVE      HPI: Yael Schmidt is a 29 y.o.  at 21w0d here for RPNV.  She has started to feel fetal movement. Sinus infection resolved. Bps at home normal.     OBJECTIVE  Visit Vitals  /72   Wt 98 kg (216 lb)   LMP 2024 (Approximate)   BMI 34.86 kg/m²   OB Status Pregnant   Smoking Status Never   BSA 2.14 m²            ASSESSMENT & PLAN    Yael Schmidt is a 29 y.o.  at 21w0d here for the following concerns we addressed today:    Problem List Items Addressed This Visit       21 weeks gestation of pregnancy ([facility])    Overview     Desired provider in labor: [] CNM  [x] Physician  [x] Blood Products: [x] Yes, accepts [] No, needs counseling  [x] Initial BMI: 34  [x] Prenatal Labs: O+, RI, Hep B non immune (Declines vaccine), Hgb 13.0  [x] Cervical Cancer Screening up to date  [x] Rh status: positive  [x] Genetic Screening:  neg CF/SMA/fragile X and rr cfDNA  [x] NT US: (11-13 wks): suboptimal views but no obvious abnormality  [x] Baby ASA (if indicated): Indicated, started   [x] Pregnancy dated by: LMP consistend with an 11 week  ultrasound    [] Anatomy US: (19-20 wks): incomplete, scheduled for follow up   [] Federal Sterilization consent signed (if indicated):  [] 1hr GCT at 24-28wks: ordered for NV  [x] Rhogam (if indicated): Not indicated  [] Fetal Surveillance (if indicated):  [] Tdap (27-32 wks, may be given up to 36 wks if initial window missed):   [] RSV (32-36 wks) (Sept. to end of ):   [x] Flu Vaccine: discussed 24 and declines    [] Breastfeeding:  [] Postpartum Birth control method:   [] GBS at 36 - 37 wks:  [] 39 weeks discussion of IOL vs. Expectant management:  [] Mode of delivery ( anticipated ):           Chronic hypertension in pregnancy ([facility])    Overview     Not on meds  Monitoring Bps at home   Baseline PEC labs normal         Supervision of high risk pregnancy, antepartum ([facility]) - Primary    Relevant Orders    CBC  Anemia Panel With Reflex,Pregnancy    Glucose, 1 Hour Screen, Pregnancy    Syphilis Screen with Reflex     RTC in 4 weeks      Abena Green MD

## 2024-12-09 ENCOUNTER — HOSPITAL ENCOUNTER (OUTPATIENT)
Dept: RADIOLOGY | Facility: CLINIC | Age: 29
Discharge: HOME | End: 2024-12-09
Payer: COMMERCIAL

## 2024-12-09 DIAGNOSIS — Z3A.11 11 WEEKS GESTATION OF PREGNANCY (HHS-HCC): ICD-10-CM

## 2024-12-09 PROCEDURE — 76815 OB US LIMITED FETUS(S): CPT

## 2024-12-09 PROCEDURE — 76815 OB US LIMITED FETUS(S): CPT | Performed by: OBSTETRICS & GYNECOLOGY

## 2024-12-17 ENCOUNTER — TELEPHONE (OUTPATIENT)
Dept: OBSTETRICS AND GYNECOLOGY | Facility: CLINIC | Age: 29
End: 2024-12-17
Payer: COMMERCIAL

## 2024-12-19 ENCOUNTER — APPOINTMENT (OUTPATIENT)
Dept: OBSTETRICS AND GYNECOLOGY | Facility: CLINIC | Age: 29
End: 2024-12-19
Payer: COMMERCIAL

## 2024-12-19 ENCOUNTER — LAB (OUTPATIENT)
Dept: LAB | Facility: LAB | Age: 29
End: 2024-12-19
Payer: COMMERCIAL

## 2024-12-19 VITALS — DIASTOLIC BLOOD PRESSURE: 68 MMHG | SYSTOLIC BLOOD PRESSURE: 130 MMHG | BODY MASS INDEX: 35.02 KG/M2 | WEIGHT: 217 LBS

## 2024-12-19 DIAGNOSIS — Z3A.24 24 WEEKS GESTATION OF PREGNANCY (HHS-HCC): ICD-10-CM

## 2024-12-19 DIAGNOSIS — O09.90 SUPERVISION OF HIGH RISK PREGNANCY, ANTEPARTUM (HHS-HCC): ICD-10-CM

## 2024-12-19 DIAGNOSIS — O09.90 SUPERVISION OF HIGH RISK PREGNANCY, ANTEPARTUM (HHS-HCC): Primary | ICD-10-CM

## 2024-12-19 DIAGNOSIS — O10.919 CHRONIC HYPERTENSION IN PREGNANCY (HHS-HCC): ICD-10-CM

## 2024-12-19 LAB
ERYTHROCYTE [DISTWIDTH] IN BLOOD BY AUTOMATED COUNT: 13.4 % (ref 11.5–14.5)
GLUCOSE 1H P 50 G GLC PO SERPL-MCNC: 107 MG/DL
HCT VFR BLD AUTO: 35.5 % (ref 36–46)
HGB BLD-MCNC: 11.8 G/DL (ref 12–16)
MCH RBC QN AUTO: 26.8 PG (ref 26–34)
MCHC RBC AUTO-ENTMCNC: 33.2 G/DL (ref 32–36)
MCV RBC AUTO: 81 FL (ref 80–100)
NRBC BLD-RTO: 0 /100 WBCS (ref 0–0)
PLATELET # BLD AUTO: 275 X10*3/UL (ref 150–450)
RBC # BLD AUTO: 4.4 X10*6/UL (ref 4–5.2)
REFLEX ADDED, ANEMIA PANEL: NORMAL
WBC # BLD AUTO: 14.4 X10*3/UL (ref 4.4–11.3)

## 2024-12-19 PROCEDURE — 86780 TREPONEMA PALLIDUM: CPT

## 2024-12-19 PROCEDURE — 85027 COMPLETE CBC AUTOMATED: CPT

## 2024-12-19 PROCEDURE — 0501F PRENATAL FLOW SHEET: CPT | Performed by: OBSTETRICS & GYNECOLOGY

## 2024-12-19 PROCEDURE — 36415 COLL VENOUS BLD VENIPUNCTURE: CPT

## 2024-12-19 PROCEDURE — 82947 ASSAY GLUCOSE BLOOD QUANT: CPT

## 2024-12-19 NOTE — PROGRESS NOTES
Ob Follow-up  24     SUBJECTIVE    HPI: Yael Schmidt is a 29 y.o.  at 24w6d here for RPNV.  She has no contractions, bleeding, or LOF. Reports normal fetal movement. Patient reports left sided pelvic pain/discomfort. It comes and goes. Denies pain today. Denies bleeding, LOF, fevers.     OBJECTIVE  Visit Vitals  /68   Wt 98.4 kg (217 lb)   LMP 2024 (Approximate)   BMI 35.02 kg/m²   OB Status Pregnant   Smoking Status Never   BSA 2.14 m²        ASSESSMENT & PLAN    Yael Schmidt is a 29 y.o.  at 24w6d here for the following concerns we addressed today:    Problem List Items Addressed This Visit       24 weeks gestation of pregnancy ([facility])    Overview     Desired provider in labor: [] CNM  [x] Physician  [x] Blood Products: [x] Yes, accepts [] No, needs counseling  [x] Initial BMI: 34  [x] Prenatal Labs: O+, RI, Hep B non immune (Declines vaccine), Hgb 13.0  [x] Cervical Cancer Screening up to date  [x] Rh status: positive  [x] Genetic Screening:  neg CF/SMA/fragile X and rr cfDNA  [x] NT US: (11-13 wks): suboptimal views but no obvious abnormality  [x] Baby ASA (if indicated): Indicated, started   [x] Pregnancy dated by: LMP consistend with an 11 week  ultrasound    [] Anatomy US: (19-20 wks): incomplete on second attempt, scheduled for follow up   [] Federal Sterilization consent signed (if indicated):  [] 1hr GCT at 24-28wks: pending from   [x] Rhogam (if indicated): Not indicated  [] Fetal Surveillance (if indicated):  [] Tdap (27-32 wks, may be given up to 36 wks if initial window missed): unsure, will consider for NV  [] RSV (32-36 wks) (Sept. to end of ): Likely declines  [x] Flu Vaccine: discussed 24 and declines    [x] Breastfeeding: planning to bf, has spectra pump  [] Postpartum Birth control method:   [] GBS at 36 - 37 wks:  [] 39 weeks discussion of IOL vs. Expectant management:  [x] Mode of delivery ( anticipated ):            Chronic  hypertension in pregnancy ([facility])    Overview     Not on meds  Monitoring Bps at home   Baseline PEC labs normal         Supervision of high risk pregnancy, antepartum ([facility]) - Primary       RTC in 4 weeks      Abena Green MD

## 2024-12-20 LAB — TREPONEMA PALLIDUM IGG+IGM AB [PRESENCE] IN SERUM OR PLASMA BY IMMUNOASSAY: NONREACTIVE

## 2025-01-09 ENCOUNTER — HOSPITAL ENCOUNTER (OUTPATIENT)
Dept: RADIOLOGY | Facility: CLINIC | Age: 30
Discharge: HOME | End: 2025-01-09
Payer: COMMERCIAL

## 2025-01-09 ENCOUNTER — APPOINTMENT (OUTPATIENT)
Dept: OBSTETRICS AND GYNECOLOGY | Facility: CLINIC | Age: 30
End: 2025-01-09
Payer: COMMERCIAL

## 2025-01-09 VITALS — BODY MASS INDEX: 35.99 KG/M2 | SYSTOLIC BLOOD PRESSURE: 126 MMHG | WEIGHT: 223 LBS | DIASTOLIC BLOOD PRESSURE: 78 MMHG

## 2025-01-09 DIAGNOSIS — O10.919 CHRONIC HYPERTENSION IN PREGNANCY (HHS-HCC): Primary | ICD-10-CM

## 2025-01-09 DIAGNOSIS — Z3A.27 27 WEEKS GESTATION OF PREGNANCY (HHS-HCC): ICD-10-CM

## 2025-01-09 DIAGNOSIS — Z03.74 ENCOUNTER FOR SUSPECTED PROBLEM WITH FETAL GROWTH RULED OUT: ICD-10-CM

## 2025-01-09 DIAGNOSIS — O99.210 OBESITY AFFECTING PREGNANCY (HHS-HCC): ICD-10-CM

## 2025-01-09 DIAGNOSIS — Z3A.09 9 WEEKS GESTATION OF PREGNANCY (HHS-HCC): ICD-10-CM

## 2025-01-09 PROCEDURE — 76816 OB US FOLLOW-UP PER FETUS: CPT | Performed by: STUDENT IN AN ORGANIZED HEALTH CARE EDUCATION/TRAINING PROGRAM

## 2025-01-09 PROCEDURE — 76816 OB US FOLLOW-UP PER FETUS: CPT

## 2025-01-09 PROCEDURE — 0501F PRENATAL FLOW SHEET: CPT | Performed by: OBSTETRICS & GYNECOLOGY

## 2025-01-09 NOTE — PROGRESS NOTES
Ob Follow-up  25     SUBJECTIVE      HPI: Yael Schmidt is a 29 y.o.  at 27w6d here for RPNV.  She has no contractions, bleeding, or LOF. Reports normal fetal movement. Patient reports some pitting edema in her anterior calves. No calf pain. No edema in feet or ankles.       OBJECTIVE  Visit Vitals  /78   Wt 101 kg (223 lb)   LMP 2024 (Approximate)   BMI 35.99 kg/m²   OB Status Pregnant   Smoking Status Never   BSA 2.17 m²        ASSESSMENT & PLAN    Yael Schmidt is a 29 y.o.  at 27w6d here for the following concerns we addressed today:    Problem List Items Addressed This Visit          Pregnancy    Chronic hypertension in pregnancy ([facility]) - Primary    Overview     Not on meds  Monitoring Bps at home   Baseline PEC labs normal         24 weeks gestation of pregnancy ([facility])    Overview     Desired provider in labor: [] CNM  [x] Physician  [x] Blood Products: [x] Yes, accepts [] No, needs counseling  [x] Initial BMI: 34  [x] Prenatal Labs: O+, RI, Hep B non immune (Declines vaccine), Hgb 13.0  [x] Cervical Cancer Screening up to date  [x] Rh status: positive  [x] Genetic Screening:  neg CF/SMA/fragile X and rr cfDNA  [x] NT US: (11-13 wks): suboptimal views but no obvious abnormality  [x] Baby ASA (if indicated): Indicated, started   [x] Pregnancy dated by: LMP consistend with an 11 week  ultrasound    [] Anatomy US: (19-20 wks): incomplete on second attempt, scheduled for follow up   [] Federal Sterilization consent signed (if indicated):  [x] 1hr GCT at 24-28wks: normal at 107  [x] Rhogam (if indicated): Not indicated  [] Fetal Surveillance (if indicated):  [] Tdap (27-32 wks, may be given up to 36 wks if initial window missed): unsure, will consider for NV  [x] RSV (32-36 wks) (Sept. to end of ): Declines  [x] Flu Vaccine: discussed 24 and declines    [x] Breastfeeding: planning to bf, has spectra pump  [] Postpartum Birth control method:   [] GBS at 36 - 37  wks:  [] 39 weeks discussion of IOL vs. Expectant management:  [x] Mode of delivery ( anticipated ):                 No orders of the defined types were placed in this encounter.       RTC in 2 weeks      Abena Green MD

## 2025-01-23 ENCOUNTER — APPOINTMENT (OUTPATIENT)
Dept: OBSTETRICS AND GYNECOLOGY | Facility: CLINIC | Age: 30
End: 2025-01-23
Payer: COMMERCIAL

## 2025-01-23 VITALS — SYSTOLIC BLOOD PRESSURE: 120 MMHG | WEIGHT: 222 LBS | BODY MASS INDEX: 35.83 KG/M2 | DIASTOLIC BLOOD PRESSURE: 70 MMHG

## 2025-01-23 DIAGNOSIS — O10.919 CHRONIC HYPERTENSION IN PREGNANCY (HHS-HCC): Primary | ICD-10-CM

## 2025-01-23 DIAGNOSIS — Z3A.29 29 WEEKS GESTATION OF PREGNANCY (HHS-HCC): ICD-10-CM

## 2025-01-23 PROCEDURE — 0501F PRENATAL FLOW SHEET: CPT | Performed by: OBSTETRICS & GYNECOLOGY

## 2025-01-23 NOTE — PROGRESS NOTES
Ob Follow-up  25     SUBJECTIVE      HPI: Yael Schmidt is a 29 y.o.  at 29w6d here for RPNV.  She has no contractions, bleeding, or LOF. Reports normal fetal movement. Passed a kidney stone last week. Feeling better.       OBJECTIVE  Visit Vitals  /70   Wt 101 kg (222 lb)   LMP 2024 (Approximate)   BMI 35.83 kg/m²   OB Status Pregnant   Smoking Status Never   BSA 2.17 m²            ASSESSMENT & PLAN    Yael Schmidt is a 29 y.o.  at 29w6d here for the following concerns we addressed today:    Problem List Items Addressed This Visit          Pregnancy    Chronic hypertension in pregnancy ([facility]) - Primary    Overview     Not on meds  Monitoring Bps at home   Baseline PEC labs normal         29 weeks gestation of pregnancy ([facility])    Overview     Desired provider in labor: [] CNM  [x] Physician  [x] Blood Products: [x] Yes, accepts [] No, needs counseling  [x] Initial BMI: 34  [x] Prenatal Labs: O+, RI, Hep B non immune (Declines vaccine), Hgb 13.0  [x] Cervical Cancer Screening up to date  [x] Rh status: positive  [x] Genetic Screening:  neg CF/SMA/fragile X and rr cfDNA  [x] NT US: (11-13 wks): suboptimal views but no obvious abnormality  [x] Baby ASA (if indicated): Indicated, started   [x] Pregnancy dated by: LMP consistend with an 11 week  ultrasound    [x] Anatomy US: (19-20 wks): completed, normal  [x] Federal Sterilization consent signed (if indicated): N/A  [x] 1hr GCT at 24-28wks: normal at 107  [x] Rhogam (if indicated): Not indicated  [] Fetal Surveillance (if indicated):  [] Tdap (27-32 wks, may be given up to 36 wks if initial window missed): accepts for NV  [x] RSV (32-36 wks) (Sept. to end of ): Declines  [x] Flu Vaccine: discussed 24 and declines    [x] Breastfeeding: planning to bf, has spectra pump  [] Postpartum Birth control method: Discussed options, will consider  [] GBS at 36 - 37 wks:  [] 39 weeks discussion of IOL vs. Expectant  management: discussed 39 week IOL for HTN and she will consider.   [x] Mode of delivery ( anticipated ):                 No orders of the defined types were placed in this encounter.    RTC in 2 weeks      Abena Green MD

## 2025-02-07 ENCOUNTER — APPOINTMENT (OUTPATIENT)
Dept: OBSTETRICS AND GYNECOLOGY | Facility: CLINIC | Age: 30
End: 2025-02-07
Payer: COMMERCIAL

## 2025-02-07 VITALS — WEIGHT: 226 LBS | SYSTOLIC BLOOD PRESSURE: 136 MMHG | DIASTOLIC BLOOD PRESSURE: 60 MMHG | BODY MASS INDEX: 36.48 KG/M2

## 2025-02-07 DIAGNOSIS — Z3A.32 32 WEEKS GESTATION OF PREGNANCY (HHS-HCC): ICD-10-CM

## 2025-02-07 DIAGNOSIS — Z71.85 IMMUNIZATION COUNSELING: ICD-10-CM

## 2025-02-07 DIAGNOSIS — Z23 NEED FOR TDAP VACCINATION: ICD-10-CM

## 2025-02-07 DIAGNOSIS — O10.919 CHRONIC HYPERTENSION IN PREGNANCY (HHS-HCC): Primary | ICD-10-CM

## 2025-02-07 PROCEDURE — 0501F PRENATAL FLOW SHEET: CPT | Performed by: OBSTETRICS & GYNECOLOGY

## 2025-02-07 NOTE — PROGRESS NOTES
Ob Follow-up  25     SUBJECTIVE      HPI: Yael Schmidt is a 29 y.o.  at 32w0d here for RPNV.  She has no contractions, bleeding, or LOF. Reports normal fetal movement. Patient reports feeling more intense vaginal pressure. Feels like the baby has descended into the pelvis       OBJECTIVE  Visit Vitals  /60   Wt 103 kg (226 lb)   LMP 2024 (Approximate)   BMI 36.48 kg/m²   OB Status Pregnant   Smoking Status Never   BSA 2.19 m²            ASSESSMENT & PLAN    Yael Schmidt is a 29 y.o.  at 32w0d here for the following concerns we addressed today:    Problem List Items Addressed This Visit          Pregnancy    Chronic hypertension in pregnancy ([facility]) - Primary    Overview     Not on meds  Monitoring Bps at home   Baseline PEC labs normal             Current Assessment & Plan     Reports normal Bps at home  No symptoms of PEC         32 weeks gestation of pregnancy ([facility])    Overview     Desired provider in labor: [] CNM  [x] Physician  [x] Blood Products: [x] Yes, accepts [] No, needs counseling  [x] Initial BMI: 34  [x] Prenatal Labs: O+, RI, Hep B non immune (Declines vaccine), Hgb 13.0  [x] Cervical Cancer Screening up to date  [x] Rh status: positive  [x] Genetic Screening:  neg CF/SMA/fragile X and rr cfDNA  [x] NT US: (11-13 wks): suboptimal views but no obvious abnormality  [x] Baby ASA (if indicated): Indicated, started   [x] Pregnancy dated by: LMP consistend with an 11 week  ultrasound    [x] Anatomy US: (19-20 wks): completed, normal  [x] Federal Sterilization consent signed (if indicated): N/A  [x] 1hr GCT at 24-28wks: normal at 107  [x] Rhogam (if indicated): Not indicated  [x] Fetal Surveillance (if indicated): Not indicated  [x] Tdap (27-32 wks, may be given up to 36 wks if initial window missed): Declines  [x] RSV (32-36 wks) (Sept. to end of ): Declines  [x] Flu Vaccine: discussed 24 and declines    [x] Breastfeeding: planning to bf, has spectra  pump  [x] Postpartum Birth control method: declines contraception immediately PP, plans for lactational amenorrhea and then possibly IUD vs cOCP in the future  [] GBS at 36 - 37 wks:  [] 39 weeks discussion of IOL vs. Expectant management: cHTN without meds, delivery 38-39 weeks. Discussed and she will consider  [x] Mode of delivery ( anticipated ): , kids in the sun for peds           Current Assessment & Plan     Declines TDAP          Other Visit Diagnoses       Need for Tdap vaccination        Immunization counseling                  No orders of the defined types were placed in this encounter.       RTC in 2 weeks      Abena Green MD

## 2025-02-20 ENCOUNTER — APPOINTMENT (OUTPATIENT)
Dept: OBSTETRICS AND GYNECOLOGY | Facility: CLINIC | Age: 30
End: 2025-02-20
Payer: COMMERCIAL

## 2025-02-20 VITALS — WEIGHT: 227 LBS | DIASTOLIC BLOOD PRESSURE: 68 MMHG | BODY MASS INDEX: 36.64 KG/M2 | SYSTOLIC BLOOD PRESSURE: 108 MMHG

## 2025-02-20 DIAGNOSIS — N93.9 VAGINAL SPOTTING: Primary | ICD-10-CM

## 2025-02-20 DIAGNOSIS — O10.919 CHRONIC HYPERTENSION IN PREGNANCY (HHS-HCC): ICD-10-CM

## 2025-02-20 DIAGNOSIS — Z3A.33 33 WEEKS GESTATION OF PREGNANCY (HHS-HCC): ICD-10-CM

## 2025-02-20 NOTE — PROCEDURES
Yael Schmidt, a  at 33w6d with an MARCO of 2025, by Last Menstrual Period, was seen at [facility] for a nonstress test.    Non-Stress Test   Baseline Fetal Heart Rate for Non-Stress Test: 130 BPM  Variability in Waveform for Non-Stress Test: Moderate  Accelerations in Non-Stress Test: Yes  Decelerations in Non-Stress Test: None  Contractions in Non-Stress Test: Irregular  Interpretation of Non-Stress Test   Interpretation of Non-Stress Test: Reactive

## 2025-02-20 NOTE — PROGRESS NOTES
Ob Follow-up  25     SUBJECTIVE      HPI: Yael Schmidt is a 29 y.o.  at 33w6d here for RPNV.  She has no contractions, or LOF. Reports normal fetal movement. Patient reports noticing vaginal spotting on arrival to the office today. There was a few spots of bright red blood on her TP as well as on her underwear. She also has some slight cramping. Denies recent intercourse.        OBJECTIVE  Visit Vitals  /68   Wt 103 kg (227 lb)   LMP 2024 (Approximate)   BMI 36.64 kg/m²   OB Status Pregnant   Smoking Status Never   BSA 2.19 m²            ASSESSMENT & PLAN    Yael Schmidt is a 29 y.o.  at 33w6d here for the following concerns we addressed today:    Problem List Items Addressed This Visit          Pregnancy    Vaginal spotting - Primary    Overview     No blood noted in vaginal vault on exam  NST reactive  Ok to monitor at home. Present to L&D for persistent/worsening symptoms.         Relevant Orders    Vaginitis Gram Stain For Bacterial Vaginosis + Yeast    Chronic hypertension in pregnancy ([facility])    Overview     Not on meds  Monitoring Bps at home   Baseline PEC labs normal             Current Assessment & Plan     Reports low/normal  Bps at home since last visit  Scheduled for ultrasound next week         33 weeks gestation of pregnancy ([facility])    Overview     Desired provider in labor: [] CNM  [x] Physician  [x] Blood Products: [x] Yes, accepts [] No, needs counseling  [x] Initial BMI: 34  [x] Prenatal Labs: O+, RI, Hep B non immune (Declines vaccine), Hgb 13.0  [x] Cervical Cancer Screening up to date  [x] Rh status: positive  [x] Genetic Screening:  neg CF/SMA/fragile X and rr cfDNA  [x] NT US: (11-13 wks): suboptimal views but no obvious abnormality  [x] Baby ASA (if indicated): Indicated, started   [x] Pregnancy dated by: LMP consistend with an 11 week  ultrasound    [x] Anatomy US: (19-20 wks): completed, normal  [x] Federal Sterilization consent signed (if  indicated): N/A  [x] 1hr GCT at 24-28wks: normal at 107  [x] Rhogam (if indicated): Not indicated  [x] Fetal Surveillance (if indicated): Not indicated  [x] Tdap (27-32 wks, may be given up to 36 wks if initial window missed): Declines  [x] RSV (32-36 wks) (Sept. to end of ): Declines  [x] Flu Vaccine: discussed 24 and declines    [x] Breastfeeding: planning to bf, has spectra pump  [x] Postpartum Birth control method: declines contraception immediately PP, plans for lactational amenorrhea and then possibly IUD vs cOCP in the future  [] GBS at 36 - 37 wks:  [] 39 weeks discussion of IOL vs. Expectant management: cHTN without meds, delivery 38-39 weeks. Discussed and she will consider  [x] Mode of delivery ( anticipated ): , kids in the sun for peds                Orders Placed This Encounter   Procedures    Vaginitis Gram Stain For Bacterial Vaginosis + Yeast     Order Specific Question:   SOURCE:     Answer:   vaginal     Order Specific Question:   [facility]     Answer:   Immediate [1]        RTC in 2 weeks      Abena Green MD

## 2025-02-21 DIAGNOSIS — B37.9 YEAST INFECTION: Primary | ICD-10-CM

## 2025-02-21 LAB — BV SCORE VAG QL: NORMAL

## 2025-02-21 RX ORDER — TERCONAZOLE 4 MG/G
1 CREAM VAGINAL NIGHTLY
Qty: 45 G | Refills: 0 | Status: SHIPPED | OUTPATIENT
Start: 2025-02-21 | End: 2025-02-28

## 2025-02-26 ENCOUNTER — HOSPITAL ENCOUNTER (OUTPATIENT)
Dept: RADIOLOGY | Facility: CLINIC | Age: 30
Discharge: HOME | End: 2025-02-26
Payer: COMMERCIAL

## 2025-02-26 DIAGNOSIS — Z3A.11 11 WEEKS GESTATION OF PREGNANCY (HHS-HCC): ICD-10-CM

## 2025-02-26 PROCEDURE — 76819 FETAL BIOPHYS PROFIL W/O NST: CPT | Performed by: STUDENT IN AN ORGANIZED HEALTH CARE EDUCATION/TRAINING PROGRAM

## 2025-02-26 PROCEDURE — 76816 OB US FOLLOW-UP PER FETUS: CPT | Performed by: STUDENT IN AN ORGANIZED HEALTH CARE EDUCATION/TRAINING PROGRAM

## 2025-02-26 PROCEDURE — 76816 OB US FOLLOW-UP PER FETUS: CPT

## 2025-03-07 ENCOUNTER — APPOINTMENT (OUTPATIENT)
Dept: OBSTETRICS AND GYNECOLOGY | Facility: CLINIC | Age: 30
End: 2025-03-07
Payer: COMMERCIAL

## 2025-03-07 VITALS — BODY MASS INDEX: 37.12 KG/M2 | DIASTOLIC BLOOD PRESSURE: 72 MMHG | WEIGHT: 230 LBS | SYSTOLIC BLOOD PRESSURE: 138 MMHG

## 2025-03-07 DIAGNOSIS — Z3A.36 36 WEEKS GESTATION OF PREGNANCY (HHS-HCC): ICD-10-CM

## 2025-03-07 DIAGNOSIS — O10.919 CHRONIC HYPERTENSION IN PREGNANCY (HHS-HCC): Primary | ICD-10-CM

## 2025-03-07 PROBLEM — N93.9 VAGINAL SPOTTING: Status: RESOLVED | Noted: 2025-02-20 | Resolved: 2025-03-07

## 2025-03-07 PROCEDURE — 0501F PRENATAL FLOW SHEET: CPT | Performed by: OBSTETRICS & GYNECOLOGY

## 2025-03-07 NOTE — ASSESSMENT & PLAN NOTE
Reports normal Bps at home  Reviewed PEC symptoms  Reviewed recommendation for delivery at 38 to 39 weeks. She is hesitant and will consider.

## 2025-03-07 NOTE — PROGRESS NOTES
Ob Follow-up  25     SUBJECTIVE      HPI: Yael Schmidt is a 29 y.o.  at 36w0d here for RPNV.  She has irregular contractions, no bleeding, or LOF. Reports normal fetal movement. Patient reports feeling more vaginal/pelvic pressure       OBJECTIVE  Visit Vitals  /72   Wt 104 kg (230 lb)   LMP 2024 (Approximate)   BMI 37.12 kg/m²   OB Status Pregnant   Smoking Status Never   BSA 2.2 m²      Cervix       ASSESSMENT & PLAN    Yael Schmidt is a 29 y.o.  at 36w0d here for the following concerns we addressed today:    Problem List Items Addressed This Visit          Pregnancy    Chronic hypertension in pregnancy ([facility]) - Primary    Overview     Not on meds  Monitoring Bps at home   Baseline PEC labs normal  Delivery recommended at 38-39 weeks             Current Assessment & Plan     Reports normal Bps at home  Reviewed PEC symptoms  Reviewed recommendation for delivery at 38 to 39 weeks. She is hesitant and will consider.           Relevant Orders    Group B Streptococcus (GBS) Prenatal Screen, Culture    36 weeks gestation of pregnancy ([facility])    Overview     Desired provider in labor: [] CNM  [x] Physician  [x] Blood Products: [x] Yes, accepts [] No, needs counseling  [x] Initial BMI: 34  [x] Prenatal Labs: O+, RI, Hep B non immune (Declines vaccine), Hgb 13.0  [x] Cervical Cancer Screening up to date  [x] Rh status: positive  [x] Genetic Screening:  neg CF/SMA/fragile X and rr cfDNA  [x] NT US: (11-13 wks): suboptimal views but no obvious abnormality  [x] Baby ASA (if indicated): Indicated, started   [x] Pregnancy dated by: LMP consistend with an 11 week  ultrasound    [x] Anatomy US: (19-20 wks): completed, normal  [x] Federal Sterilization consent signed (if indicated): N/A  [x] 1hr GCT at 24-28wks: normal at 107  [x] Rhogam (if indicated): Not indicated  [x] Fetal Surveillance (if indicated): Not indicated  [x] Tdap (27-32 wks, may be given up to 36 wks if  initial window missed): Declines  [x] RSV (32-36 wks) (Sept. to end of ): Declines  [x] Flu Vaccine: discussed 24 and declines    [x] Breastfeeding: planning to bf, has spectra pump  [x] Postpartum Birth control method: declines contraception immediately PP, plans for lactational amenorrhea and then possibly IUD vs cOCP in the future  [] GBS at 36 - 37 wks:  [] 39 weeks discussion of IOL vs. Expectant management: cHTN without meds, delivery 38-39 weeks. Discussed and she will consider  [x] Mode of delivery ( anticipated ): , kids in the sun for peds                Orders Placed This Encounter   Procedures    Group B Streptococcus (GBS) Prenatal Screen, Culture     Order Specific Question:   Release result to [facility]     Answer:   Immediate [1]        RTC in 1 week      Abena Green MD

## 2025-03-09 LAB — GP B STREP SPEC QL CULT: NORMAL

## 2025-03-10 ENCOUNTER — TELEPHONE (OUTPATIENT)
Dept: OBSTETRICS AND GYNECOLOGY | Facility: CLINIC | Age: 30
End: 2025-03-10
Payer: COMMERCIAL

## 2025-03-10 ENCOUNTER — HOSPITAL ENCOUNTER (OUTPATIENT)
Facility: HOSPITAL | Age: 30
Discharge: HOME | End: 2025-03-10
Attending: STUDENT IN AN ORGANIZED HEALTH CARE EDUCATION/TRAINING PROGRAM | Admitting: STUDENT IN AN ORGANIZED HEALTH CARE EDUCATION/TRAINING PROGRAM
Payer: COMMERCIAL

## 2025-03-10 VITALS
DIASTOLIC BLOOD PRESSURE: 65 MMHG | BODY MASS INDEX: 37.34 KG/M2 | SYSTOLIC BLOOD PRESSURE: 141 MMHG | RESPIRATION RATE: 18 BRPM | WEIGHT: 232.37 LBS | OXYGEN SATURATION: 96 % | HEIGHT: 66 IN | HEART RATE: 87 BPM

## 2025-03-10 LAB
ALBUMIN SERPL BCP-MCNC: 3.2 G/DL (ref 3.4–5)
ALP SERPL-CCNC: 113 U/L (ref 33–110)
ALT SERPL W P-5'-P-CCNC: 6 U/L (ref 7–45)
ANION GAP SERPL CALC-SCNC: 13 MMOL/L (ref 10–20)
AST SERPL W P-5'-P-CCNC: 11 U/L (ref 9–39)
BILIRUB SERPL-MCNC: 0.3 MG/DL (ref 0–1.2)
BUN SERPL-MCNC: 10 MG/DL (ref 6–23)
CALCIUM SERPL-MCNC: 8.4 MG/DL (ref 8.6–10.6)
CHLORIDE SERPL-SCNC: 105 MMOL/L (ref 98–107)
CO2 SERPL-SCNC: 23 MMOL/L (ref 21–32)
CREAT SERPL-MCNC: 0.49 MG/DL (ref 0.5–1.05)
CREAT UR-MCNC: 112.3 MG/DL (ref 20–320)
EGFRCR SERPLBLD CKD-EPI 2021: >90 ML/MIN/1.73M*2
ERYTHROCYTE [DISTWIDTH] IN BLOOD BY AUTOMATED COUNT: 13.6 % (ref 11.5–14.5)
GLUCOSE SERPL-MCNC: 76 MG/DL (ref 74–99)
GP B STREP SPEC QL CULT: NORMAL
HCT VFR BLD AUTO: 35 % (ref 36–46)
HGB BLD-MCNC: 11.6 G/DL (ref 12–16)
MCH RBC QN AUTO: 26.3 PG (ref 26–34)
MCHC RBC AUTO-ENTMCNC: 33.1 G/DL (ref 32–36)
MCV RBC AUTO: 79 FL (ref 80–100)
NRBC BLD-RTO: 0 /100 WBCS (ref 0–0)
PLATELET # BLD AUTO: 223 X10*3/UL (ref 150–450)
POTASSIUM SERPL-SCNC: 3.7 MMOL/L (ref 3.5–5.3)
PROT SERPL-MCNC: 6.2 G/DL (ref 6.4–8.2)
PROT UR-ACNC: 13 MG/DL (ref 5–24)
PROT/CREAT UR: 0.12 MG/MG CREAT (ref 0–0.17)
RBC # BLD AUTO: 4.41 X10*6/UL (ref 4–5.2)
SODIUM SERPL-SCNC: 137 MMOL/L (ref 136–145)
WBC # BLD AUTO: 14.1 X10*3/UL (ref 4.4–11.3)

## 2025-03-10 PROCEDURE — 80053 COMPREHEN METABOLIC PANEL: CPT

## 2025-03-10 PROCEDURE — 2500000001 HC RX 250 WO HCPCS SELF ADMINISTERED DRUGS (ALT 637 FOR MEDICARE OP)

## 2025-03-10 PROCEDURE — 59025 FETAL NON-STRESS TEST: CPT

## 2025-03-10 PROCEDURE — 99214 OFFICE O/P EST MOD 30 MIN: CPT

## 2025-03-10 PROCEDURE — 36415 COLL VENOUS BLD VENIPUNCTURE: CPT

## 2025-03-10 PROCEDURE — 85027 COMPLETE CBC AUTOMATED: CPT

## 2025-03-10 PROCEDURE — 84156 ASSAY OF PROTEIN URINE: CPT

## 2025-03-10 PROCEDURE — 99214 OFFICE O/P EST MOD 30 MIN: CPT | Mod: 25

## 2025-03-10 RX ORDER — ASPIRIN 81 MG/1
81 TABLET ORAL DAILY
COMMUNITY

## 2025-03-10 RX ORDER — ONDANSETRON 4 MG/1
4 TABLET, FILM COATED ORAL EVERY 6 HOURS PRN
Status: DISCONTINUED | OUTPATIENT
Start: 2025-03-10 | End: 2025-03-10 | Stop reason: HOSPADM

## 2025-03-10 RX ORDER — METOCLOPRAMIDE 10 MG/1
10 TABLET ORAL ONCE
Status: COMPLETED | OUTPATIENT
Start: 2025-03-10 | End: 2025-03-10

## 2025-03-10 RX ORDER — ONDANSETRON HYDROCHLORIDE 2 MG/ML
4 INJECTION, SOLUTION INTRAVENOUS EVERY 6 HOURS PRN
Status: DISCONTINUED | OUTPATIENT
Start: 2025-03-10 | End: 2025-03-10 | Stop reason: HOSPADM

## 2025-03-10 RX ORDER — ACETAMINOPHEN 325 MG/1
975 TABLET ORAL ONCE
Status: COMPLETED | OUTPATIENT
Start: 2025-03-10 | End: 2025-03-10

## 2025-03-10 RX ORDER — DIPHENHYDRAMINE HCL 25 MG
25 CAPSULE ORAL ONCE
Status: COMPLETED | OUTPATIENT
Start: 2025-03-10 | End: 2025-03-10

## 2025-03-10 RX ADMIN — METOCLOPRAMIDE 10 MG: 10 TABLET ORAL at 15:39

## 2025-03-10 RX ADMIN — ACETAMINOPHEN 975 MG: 325 TABLET ORAL at 14:02

## 2025-03-10 RX ADMIN — DIPHENHYDRAMINE HYDROCHLORIDE 25 MG: 25 CAPSULE ORAL at 15:39

## 2025-03-10 SDOH — SOCIAL STABILITY: SOCIAL INSECURITY: ARE THERE ANY APPARENT SIGNS OF INJURIES/BEHAVIORS THAT COULD BE RELATED TO ABUSE/NEGLECT?: NO

## 2025-03-10 SDOH — SOCIAL STABILITY: SOCIAL INSECURITY: DOES ANYONE TRY TO KEEP YOU FROM HAVING/CONTACTING OTHER FRIENDS OR DOING THINGS OUTSIDE YOUR HOME?: NO

## 2025-03-10 SDOH — SOCIAL STABILITY: SOCIAL INSECURITY: VERBAL ABUSE: DENIES

## 2025-03-10 SDOH — SOCIAL STABILITY: SOCIAL INSECURITY: HAVE YOU HAD THOUGHTS OF HARMING ANYONE ELSE?: NO

## 2025-03-10 SDOH — SOCIAL STABILITY: SOCIAL INSECURITY: ARE YOU OR HAVE YOU BEEN THREATENED OR ABUSED PHYSICALLY, EMOTIONALLY, OR SEXUALLY BY ANYONE?: NO

## 2025-03-10 SDOH — HEALTH STABILITY: MENTAL HEALTH: WERE YOU ABLE TO COMPLETE ALL THE BEHAVIORAL HEALTH SCREENINGS?: YES

## 2025-03-10 SDOH — SOCIAL STABILITY: SOCIAL INSECURITY: HAS ANYONE EVER THREATENED TO HURT YOUR FAMILY OR YOUR PETS?: NO

## 2025-03-10 SDOH — SOCIAL STABILITY: SOCIAL INSECURITY: DO YOU FEEL ANYONE HAS EXPLOITED OR TAKEN ADVANTAGE OF YOU FINANCIALLY OR OF YOUR PERSONAL PROPERTY?: NO

## 2025-03-10 SDOH — HEALTH STABILITY: MENTAL HEALTH: HAVE YOU USED ANY PRESCRIPTION DRUGS OTHER THAN PRESCRIBED IN THE PAST 12 MONTHS?: NO

## 2025-03-10 SDOH — HEALTH STABILITY: MENTAL HEALTH: WISH TO BE DEAD (PAST 1 MONTH): NO

## 2025-03-10 SDOH — HEALTH STABILITY: MENTAL HEALTH: SUICIDAL BEHAVIOR (LIFETIME): NO

## 2025-03-10 SDOH — ECONOMIC STABILITY: HOUSING INSECURITY: DO YOU FEEL UNSAFE GOING BACK TO THE PLACE WHERE YOU ARE LIVING?: NO

## 2025-03-10 SDOH — HEALTH STABILITY: MENTAL HEALTH: NON-SPECIFIC ACTIVE SUICIDAL THOUGHTS (PAST 1 MONTH): NO

## 2025-03-10 SDOH — SOCIAL STABILITY: SOCIAL INSECURITY: HAVE YOU HAD ANY THOUGHTS OF HARMING ANYONE ELSE?: NO

## 2025-03-10 SDOH — SOCIAL STABILITY: SOCIAL INSECURITY: ABUSE SCREEN: ADULT

## 2025-03-10 SDOH — SOCIAL STABILITY: SOCIAL INSECURITY: PHYSICAL ABUSE: DENIES

## 2025-03-10 SDOH — HEALTH STABILITY: MENTAL HEALTH: HAVE YOU USED ANY SUBSTANCES (CANABIS, COCAINE, HEROIN, HALLUCINOGENS, INHALANTS, ETC.) IN THE PAST 12 MONTHS?: NO

## 2025-03-10 ASSESSMENT — LIFESTYLE VARIABLES
HOW OFTEN DO YOU HAVE A DRINK CONTAINING ALCOHOL: NEVER
SKIP TO QUESTIONS 9-10: 1
AUDIT-C TOTAL SCORE: 0
AUDIT-C TOTAL SCORE: 0
HOW MANY STANDARD DRINKS CONTAINING ALCOHOL DO YOU HAVE ON A TYPICAL DAY: PATIENT DOES NOT DRINK
HOW OFTEN DO YOU HAVE 6 OR MORE DRINKS ON ONE OCCASION: NEVER

## 2025-03-10 ASSESSMENT — PAIN SCALES - GENERAL
PAINLEVEL_OUTOF10: 5 - MODERATE PAIN
PAINLEVEL_OUTOF10: 0 - NO PAIN
PAINLEVEL_OUTOF10: 3
PAINLEVEL_OUTOF10: 5 - MODERATE PAIN

## 2025-03-10 ASSESSMENT — PATIENT HEALTH QUESTIONNAIRE - PHQ9
1. LITTLE INTEREST OR PLEASURE IN DOING THINGS: NOT AT ALL
SUM OF ALL RESPONSES TO PHQ9 QUESTIONS 1 & 2: 0
2. FEELING DOWN, DEPRESSED OR HOPELESS: NOT AT ALL

## 2025-03-10 NOTE — H&P
Triage H&P    Yael Schmidt is a 29 y.o. year old at 36w3d who presents to triage for   Chief Complaint   Patient presents with    Hypertension        Assessment/Plan:    cHTN  - BPs intermittently mild range, largely normotensive, no meds  - Headache: resolved w/ tylenol, reglan, benadryl  - HELLP labs negative  - P:C 0.12  - Continue to take BPs at home  - Reviewed signs elevated BP, appropriate BP parameters and how to monitor BP at home   - Has outpatient visit Thursday 3/13, discussed possibility of induction sooner given up-trending blood pressures    IUP at 36w3d   - NST reactive  - Good fetal movement  - Continue routine prenatal care  - Next appt 3/13  - Precautions to return discussed    Maternal Well-being  - Vital signs stable and WNL  - All questions and concerns addressed     Plan and tracing reviewed with Dr. Martines.  Patient safe and stable for d/c home.    Pat Flores, APRN-CNP      Medical Problems       Problem List       36 weeks gestation of pregnancy ([facility])    Overview Addendum 2/7/2025 10:27 AM by Abena Green MD     Desired provider in labor: [] CNM  [x] Physician  [x] Blood Products: [x] Yes, accepts [] No, needs counseling  [x] Initial BMI: 34  [x] Prenatal Labs: O+, RI, Hep B non immune (Declines vaccine), Hgb 13.0  [x] Cervical Cancer Screening up to date  [x] Rh status: positive  [x] Genetic Screening:  neg CF/SMA/fragile X and rr cfDNA  [x] NT US: (11-13 wks): suboptimal views but no obvious abnormality  [x] Baby ASA (if indicated): Indicated, started   [x] Pregnancy dated by: LMP consistend with an 11 week  ultrasound    [x] Anatomy US: (19-20 wks): completed, normal  [x] Federal Sterilization consent signed (if indicated): N/A  [x] 1hr GCT at 24-28wks: normal at 107  [x] Rhogam (if indicated): Not indicated  [x] Fetal Surveillance (if indicated): Not indicated  [x] Tdap (27-32 wks, may be given up to 36 wks if initial window missed): Declines  [x] RSV (32-36  wks) (Sept. to end ): Declines  [x] Flu Vaccine: discussed 24 and declines    [x] Breastfeeding: planning to bf, has spectra pump  [x] Postpartum Birth control method: declines contraception immediately PP, plans for lactational amenorrhea and then possibly IUD vs cOCP in the future  [] GBS at 36 - 37 wks:  [] 39 weeks discussion of IOL vs. Expectant management: cHTN without meds, delivery 38-39 weeks. Discussed and she will consider  [x] Mode of delivery ( anticipated ): , kids in the sun for peds           Chronic hypertension in pregnancy ([facility])    Overview Addendum 3/7/2025 10:28 AM by Abena Green MD     Not on meds  Monitoring Bps at home   Baseline PEC labs normal  Delivery recommended at 38-39 weeks                    Subjective   Yael Schmidt is a 29 y.o. year old at 36w3d who presents to triage for elevated blood pressures at home.  States she had a severe range blood pressure and some high milds at home.  Has cHTN managed without medications.  Bps at home usually are 120s/80.  Occasionally 130s.  Had a higher normal blood pressure (138/72) in the office on Friday.     Starting to get a headache.  Rates it a 5/10.  States it is really mild.  Nothing she would take meds for at home.  Denies vision changes, CP, SOB, or RUQ pain.  Denies VB, LOF, ctx, and reports good fetal movement.     OB History          1    Para        Term                AB        Living             SAB        IAB        Ectopic        Multiple        Live Births                      Past Surgical History:   Procedure Laterality Date    WISDOM TOOTH EXTRACTION          Social History     Socioeconomic History    Marital status: Single     Spouse name: Tee    Number of children: Not on file    Years of education: Not on file    Highest education level: Not on file   Occupational History    Not on file   Tobacco Use    Smoking status: Never    Smokeless tobacco: Never   Vaping Use     Vaping status: Never Used   Substance and Sexual Activity    Alcohol use: Not Currently    Drug use: Never    Sexual activity: Yes     Birth control/protection: None   Other Topics Concern    Not on file   Social History Narrative    Not on file     Social Drivers of Health     Financial Resource Strain: Medium Risk (2/20/2022)    Received from [facility]    Overall Financial Resource Strain (CARDIA)     Difficulty of Paying Living Expenses: Somewhat hard   Food Insecurity: Not on File (9/26/2024)    Received from [facility]    Food Insecurity     Food: 0   Transportation Needs: No Transportation Needs (2/20/2022)    Received from [facility]    PRAPARE - Transportation     Lack of Transportation (Medical): No     Lack of Transportation (Non-Medical): No   Physical Activity: Sufficiently Active (2/20/2022)    Received from [facility]    Exercise Vital Sign     Days of Exercise per Week: 5 days     Minutes of Exercise per Session: 80 min   Stress: Stress Concern Present (2/20/2022)    Received from [facility]    [place] [employer] - Occupational Stress Questionnaire     Feeling of Stress : Rather much   Social Connections: Not on File (9/20/2024)    Received from [facility]    Social Connections     Connectedness: 0   Intimate Partner Violence: Not on file        Allergies   Allergen Reactions    Doxycycline Other     GI UPset        No medications prior to admission.        Objective     Visit Vitals  BP (!) 141/65   Pulse 87   Resp 18        Physical Exam  Physical Exam  Exam conducted with a chaperone present.   Constitutional:       Appearance: Normal appearance.   HENT:      Head: Normocephalic.   Cardiovascular:      Rate and Rhythm: Normal rate and regular rhythm.      Pulses: Normal pulses.      Heart sounds: Normal heart sounds.   Pulmonary:      Effort: Pulmonary effort is normal.      Breath sounds:  Normal breath sounds.   Abdominal:      Comments: Gravid   Musculoskeletal:         General: Normal range of motion.   Skin:     General: Skin is warm.   Neurological:      Mental Status: She is alert and oriented to person, place, and time.   Psychiatric:         Mood and Affect: Mood normal.         Behavior: Behavior normal.          NST  Non-Stress Test   Baseline Fetal Heart Rate for Non-Stress Test: 130 BPM  Variability in Waveform for Non-Stress Test: Moderate  Accelerations in Non-Stress Test: Yes, greater than/equal to 15 bpm, lasting at least 15 seconds  Decelerations in Non-Stress Test: None  Contractions in Non-Stress Test: Not present  Interpretation of Non-Stress Test   Interpretation of Non-Stress Test: Reactive      Labs  Labs in chart were reviewed.  CBC   Recent Labs     03/10/25  1340   WBC 14.1*   HGB 11.6*   HCT 35.0*        CMP   Recent Labs     03/10/25  1340      K 3.7      CO2 23   ANIONGAP 13   BUN 10   CREATININE 0.49*   EGFR >90   CALCIUM 8.4*   ALBUMIN 3.2*   PROT 6.2*   ALKPHOS 113*   ALT 6*   AST 11   BILITOT 0.3     PCR   Recent Labs     03/10/25  1341   TPUC 13   CREATU 112.3   UTPCR 0.12

## 2025-03-10 NOTE — TELEPHONE ENCOUNTER
Patient sent [employer] message yesterday stating:   Hello! I’ve been monitoring as usual but today is the first day I’ve had a high reading. We went for a pretty long walk and about 30 mins after I checked it. BP was 166/90. I relaxed for a bit and retook it about an hour later and it was still high but 147/76.      Wondering how to move forward or just keep monitoring and at what point it becomes more urgent and needs intervention.      Also, I’m not having any unusual swelling more than my legs and just regular discomfort I have been having nothing new there.      Thanks!     -----  Called patient. Patient went on a mile walk and 30 minutes after walk she took her BP and it was 166/90. Patient denies headache, swelling of the face, hands or feet, epigastric pain, decrease fetal movement, nausea or vomiting. Patient took blood pressure again before bed and it was 120/70. This morning BP was 136/72.     Patient advised to report to labor and delivery for further evaluation due higher BP from yesterday. Patient verbalized understanding.

## 2025-03-13 ENCOUNTER — APPOINTMENT (OUTPATIENT)
Dept: OBSTETRICS AND GYNECOLOGY | Facility: CLINIC | Age: 30
End: 2025-03-13
Payer: COMMERCIAL

## 2025-03-13 VITALS — BODY MASS INDEX: 37.61 KG/M2 | SYSTOLIC BLOOD PRESSURE: 122 MMHG | DIASTOLIC BLOOD PRESSURE: 78 MMHG | WEIGHT: 233 LBS

## 2025-03-13 DIAGNOSIS — O10.919 CHRONIC HYPERTENSION IN PREGNANCY (HHS-HCC): Primary | ICD-10-CM

## 2025-03-13 DIAGNOSIS — Z3A.36 36 WEEKS GESTATION OF PREGNANCY (HHS-HCC): ICD-10-CM

## 2025-03-13 PROCEDURE — 0501F PRENATAL FLOW SHEET: CPT | Performed by: OBSTETRICS & GYNECOLOGY

## 2025-03-13 NOTE — PROGRESS NOTES
Ob Follow-up  25     SUBJECTIVE    HPI: Yael Schmidt is a 29 y.o.  at 36w6d here for RPNV.  She has rare contractions, no bleeding, or LOF. Reports normal fetal movement.     Went to triage a few days ago for a few high Bps at home, one severe range with systolic 160. She was not suspected to have pre-eclampsia at that time and was discharged. Today she feels well without headache, vision changes or RUQ pain.       OBJECTIVE  Visit Vitals  /78   Wt 106 kg (233 lb)   LMP 2024 (Approximate)   BMI 37.61 kg/m²   OB Status Pregnant   Smoking Status Never   BSA 2.22 m²        ASSESSMENT & PLAN    Yael Schmidt is a 29 y.o.  at 36w6d here for the following concerns we addressed today:    Problem List Items Addressed This Visit          Pregnancy    Chronic hypertension in pregnancy ([facility]) - Primary    Overview     Not on meds  Monitoring Bps at home   Baseline PEC labs normal             Current Assessment & Plan     Went to L&D 3/10 for elevated Bps at home. PEC labs normal. Bps normal to mild range.   Recommend delivery at 37-38 weeks since Bps are starting to worsen. IOL requested on 3/21 at 38 weeks.         36 weeks gestation of pregnancy ([facility])    Overview     Desired provider in labor: [] CNM  [x] Physician  [x] Blood Products: [x] Yes, accepts [] No, needs counseling  [x] Initial BMI: 34  [x] Prenatal Labs: O+, RI, Hep B non immune (Declines vaccine), Hgb 13.0  [x] Cervical Cancer Screening up to date  [x] Rh status: positive  [x] Genetic Screening:  neg CF/SMA/fragile X and rr cfDNA  [x] NT US: (11-13 wks): suboptimal views but no obvious abnormality  [x] Baby ASA (if indicated): Indicated, started   [x] Pregnancy dated by: LMP consistend with an 11 week  ultrasound    [x] Anatomy US: (19-20 wks): completed, normal  [x] Federal Sterilization consent signed (if indicated): N/A  [x] 1hr GCT at 24-28wks: normal at 107  [x] Rhogam (if indicated): Not indicated  [x]  Fetal Surveillance (if indicated): Not indicated  [x] Tdap (27-32 wks, may be given up to 36 wks if initial window missed): Declines  [x] RSV (32-36 wks) (Sept. to end ): Declines  [x] Flu Vaccine: discussed 24 and declines    [x] Breastfeeding: planning to bf, has spectra pump  [x] Postpartum Birth control method: declines contraception immediately PP, plans for lactational amenorrhea and then possibly IUD vs cOCP in the future  [x] GBS at 36 - 37 wks: negative  [] 39 weeks discussion of IOL vs. Expectant management: cHTN without meds, slightly worsening Bps, recommend induction 37-38 weeks, requested 3/21  [x] Mode of delivery ( anticipated ): , kids in the sun for peds                No orders of the defined types were placed in this encounter.    return for IOL or sooner PRN. Office will reach out to her with confirmation details about induction date and time.      Abena Green MD

## 2025-03-13 NOTE — ASSESSMENT & PLAN NOTE
Went to L&D 3/10 for elevated Bps at home. PEC labs normal. Bps normal to mild range.   Recommend delivery at 37-38 weeks since Bps are starting to worsen. IOL requested on 3/21 at 38 weeks.

## 2025-03-14 DIAGNOSIS — O10.919 CHRONIC HYPERTENSION IN PREGNANCY (HHS-HCC): Primary | ICD-10-CM

## 2025-03-18 ENCOUNTER — APPOINTMENT (OUTPATIENT)
Dept: OBSTETRICS AND GYNECOLOGY | Facility: CLINIC | Age: 30
End: 2025-03-18
Payer: COMMERCIAL

## 2025-03-21 ENCOUNTER — HOSPITAL ENCOUNTER (INPATIENT)
Facility: HOSPITAL | Age: 30
End: 2025-03-21
Attending: STUDENT IN AN ORGANIZED HEALTH CARE EDUCATION/TRAINING PROGRAM | Admitting: STUDENT IN AN ORGANIZED HEALTH CARE EDUCATION/TRAINING PROGRAM
Payer: COMMERCIAL

## 2025-03-21 ENCOUNTER — ANESTHESIA EVENT (OUTPATIENT)
Dept: OBSTETRICS AND GYNECOLOGY | Facility: HOSPITAL | Age: 30
End: 2025-03-21
Payer: COMMERCIAL

## 2025-03-21 ENCOUNTER — ANESTHESIA (OUTPATIENT)
Dept: OBSTETRICS AND GYNECOLOGY | Facility: HOSPITAL | Age: 30
End: 2025-03-21
Payer: COMMERCIAL

## 2025-03-21 ENCOUNTER — APPOINTMENT (OUTPATIENT)
Dept: OBSTETRICS AND GYNECOLOGY | Facility: HOSPITAL | Age: 30
End: 2025-03-21
Payer: COMMERCIAL

## 2025-03-21 DIAGNOSIS — Z34.90 ENCOUNTER FOR INDUCTION OF LABOR: Primary | ICD-10-CM

## 2025-03-21 DIAGNOSIS — O10.919 CHRONIC HYPERTENSION IN PREGNANCY (HHS-HCC): ICD-10-CM

## 2025-03-21 PROBLEM — K21.9 GASTROESOPHAGEAL REFLUX DISEASE: Status: ACTIVE | Noted: 2025-03-21

## 2025-03-21 LAB
ABO GROUP (TYPE) IN BLOOD: NORMAL
ALBUMIN SERPL BCP-MCNC: 3.2 G/DL (ref 3.4–5)
ALP SERPL-CCNC: 138 U/L (ref 33–110)
ALT SERPL W P-5'-P-CCNC: 9 U/L (ref 7–45)
ANION GAP SERPL CALC-SCNC: 13 MMOL/L (ref 10–20)
ANTIBODY SCREEN: NORMAL
AST SERPL W P-5'-P-CCNC: 8 U/L (ref 9–39)
BILIRUB SERPL-MCNC: 0.2 MG/DL (ref 0–1.2)
BUN SERPL-MCNC: 13 MG/DL (ref 6–23)
CALCIUM SERPL-MCNC: 8.9 MG/DL (ref 8.6–10.6)
CHLORIDE SERPL-SCNC: 105 MMOL/L (ref 98–107)
CO2 SERPL-SCNC: 24 MMOL/L (ref 21–32)
CREAT SERPL-MCNC: 0.69 MG/DL (ref 0.5–1.05)
CREAT UR-MCNC: 136.6 MG/DL (ref 20–320)
EGFRCR SERPLBLD CKD-EPI 2021: >90 ML/MIN/1.73M*2
ERYTHROCYTE [DISTWIDTH] IN BLOOD BY AUTOMATED COUNT: 14.1 % (ref 11.5–14.5)
GLUCOSE SERPL-MCNC: 84 MG/DL (ref 74–99)
HCT VFR BLD AUTO: 35.4 % (ref 36–46)
HGB BLD-MCNC: 11.6 G/DL (ref 12–16)
MCH RBC QN AUTO: 27 PG (ref 26–34)
MCHC RBC AUTO-ENTMCNC: 32.8 G/DL (ref 32–36)
MCV RBC AUTO: 82 FL (ref 80–100)
NRBC BLD-RTO: 0 /100 WBCS (ref 0–0)
PLATELET # BLD AUTO: 259 X10*3/UL (ref 150–450)
POTASSIUM SERPL-SCNC: 3.7 MMOL/L (ref 3.5–5.3)
PROT SERPL-MCNC: 6.3 G/DL (ref 6.4–8.2)
PROT UR-ACNC: 13 MG/DL (ref 5–24)
PROT/CREAT UR: 0.1 MG/MG CREAT (ref 0–0.17)
RBC # BLD AUTO: 4.3 X10*6/UL (ref 4–5.2)
RH FACTOR (ANTIGEN D): NORMAL
SODIUM SERPL-SCNC: 138 MMOL/L (ref 136–145)
TREPONEMA PALLIDUM IGG+IGM AB [PRESENCE] IN SERUM OR PLASMA BY IMMUNOASSAY: NONREACTIVE
WBC # BLD AUTO: 15.2 X10*3/UL (ref 4.4–11.3)

## 2025-03-21 PROCEDURE — 86850 RBC ANTIBODY SCREEN: CPT

## 2025-03-21 PROCEDURE — 2500000004 HC RX 250 GENERAL PHARMACY W/ HCPCS (ALT 636 FOR OP/ED)

## 2025-03-21 PROCEDURE — 36415 COLL VENOUS BLD VENIPUNCTURE: CPT

## 2025-03-21 PROCEDURE — 86780 TREPONEMA PALLIDUM: CPT

## 2025-03-21 PROCEDURE — 7210000002 HC LABOR PER HOUR

## 2025-03-21 PROCEDURE — 85027 COMPLETE CBC AUTOMATED: CPT

## 2025-03-21 PROCEDURE — 80053 COMPREHEN METABOLIC PANEL: CPT

## 2025-03-21 PROCEDURE — 3E0P7VZ INTRODUCTION OF HORMONE INTO FEMALE REPRODUCTIVE, VIA NATURAL OR ARTIFICIAL OPENING: ICD-10-PCS | Performed by: STUDENT IN AN ORGANIZED HEALTH CARE EDUCATION/TRAINING PROGRAM

## 2025-03-21 PROCEDURE — 2500000001 HC RX 250 WO HCPCS SELF ADMINISTERED DRUGS (ALT 637 FOR MEDICARE OP)

## 2025-03-21 PROCEDURE — 1120000001 HC OB PRIVATE ROOM DAILY

## 2025-03-21 PROCEDURE — 84156 ASSAY OF PROTEIN URINE: CPT

## 2025-03-21 PROCEDURE — 59050 FETAL MONITOR W/REPORT: CPT

## 2025-03-21 RX ORDER — SODIUM CHLORIDE, SODIUM LACTATE, POTASSIUM CHLORIDE, CALCIUM CHLORIDE 600; 310; 30; 20 MG/100ML; MG/100ML; MG/100ML; MG/100ML
75 INJECTION, SOLUTION INTRAVENOUS CONTINUOUS
Status: DISCONTINUED | OUTPATIENT
Start: 2025-03-21 | End: 2025-03-22

## 2025-03-21 RX ORDER — LIDOCAINE HYDROCHLORIDE 10 MG/ML
30 INJECTION, SOLUTION INFILTRATION; PERINEURAL ONCE AS NEEDED
Status: DISCONTINUED | OUTPATIENT
Start: 2025-03-21 | End: 2025-03-22 | Stop reason: HOSPADM

## 2025-03-21 RX ORDER — HYDRALAZINE HYDROCHLORIDE 20 MG/ML
5 INJECTION INTRAMUSCULAR; INTRAVENOUS ONCE AS NEEDED
Status: DISCONTINUED | OUTPATIENT
Start: 2025-03-21 | End: 2025-03-22 | Stop reason: HOSPADM

## 2025-03-21 RX ORDER — MISOPROSTOL 200 UG/1
800 TABLET ORAL ONCE AS NEEDED
Status: DISCONTINUED | OUTPATIENT
Start: 2025-03-21 | End: 2025-03-22 | Stop reason: HOSPADM

## 2025-03-21 RX ORDER — CARBOPROST TROMETHAMINE 250 UG/ML
250 INJECTION, SOLUTION INTRAMUSCULAR ONCE AS NEEDED
Status: DISCONTINUED | OUTPATIENT
Start: 2025-03-21 | End: 2025-03-22 | Stop reason: HOSPADM

## 2025-03-21 RX ORDER — ONDANSETRON HYDROCHLORIDE 2 MG/ML
4 INJECTION, SOLUTION INTRAVENOUS EVERY 6 HOURS PRN
Status: DISCONTINUED | OUTPATIENT
Start: 2025-03-21 | End: 2025-03-22

## 2025-03-21 RX ORDER — LOPERAMIDE HYDROCHLORIDE 2 MG/1
4 CAPSULE ORAL EVERY 2 HOUR PRN
Status: DISCONTINUED | OUTPATIENT
Start: 2025-03-21 | End: 2025-03-22 | Stop reason: HOSPADM

## 2025-03-21 RX ORDER — OXYTOCIN/0.9 % SODIUM CHLORIDE 30/500 ML
60 PLASTIC BAG, INJECTION (ML) INTRAVENOUS ONCE AS NEEDED
Status: DISCONTINUED | OUTPATIENT
Start: 2025-03-21 | End: 2025-03-22 | Stop reason: HOSPADM

## 2025-03-21 RX ORDER — OXYTOCIN/0.9 % SODIUM CHLORIDE 30/500 ML
60 PLASTIC BAG, INJECTION (ML) INTRAVENOUS ONCE AS NEEDED
Status: COMPLETED | OUTPATIENT
Start: 2025-03-21 | End: 2025-03-22

## 2025-03-21 RX ORDER — TERBUTALINE SULFATE 1 MG/ML
0.25 INJECTION SUBCUTANEOUS ONCE AS NEEDED
Status: DISCONTINUED | OUTPATIENT
Start: 2025-03-21 | End: 2025-03-22 | Stop reason: HOSPADM

## 2025-03-21 RX ORDER — LABETALOL HYDROCHLORIDE 5 MG/ML
20 INJECTION, SOLUTION INTRAVENOUS ONCE AS NEEDED
Status: DISCONTINUED | OUTPATIENT
Start: 2025-03-21 | End: 2025-03-22 | Stop reason: HOSPADM

## 2025-03-21 RX ORDER — ONDANSETRON 4 MG/1
4 TABLET, FILM COATED ORAL EVERY 6 HOURS PRN
Status: DISCONTINUED | OUTPATIENT
Start: 2025-03-21 | End: 2025-03-22

## 2025-03-21 RX ORDER — METHYLERGONOVINE MALEATE 0.2 MG/ML
0.2 INJECTION INTRAVENOUS ONCE AS NEEDED
Status: DISCONTINUED | OUTPATIENT
Start: 2025-03-21 | End: 2025-03-22 | Stop reason: HOSPADM

## 2025-03-21 RX ORDER — FENTANYL/ROPIVACAINE/NS/PF 2MCG/ML-.2
0-25 PLASTIC BAG, INJECTION (ML) INJECTION CONTINUOUS
Status: DISCONTINUED | OUTPATIENT
Start: 2025-03-21 | End: 2025-03-22

## 2025-03-21 RX ORDER — OXYTOCIN 10 [USP'U]/ML
10 INJECTION, SOLUTION INTRAMUSCULAR; INTRAVENOUS ONCE AS NEEDED
Status: DISCONTINUED | OUTPATIENT
Start: 2025-03-21 | End: 2025-03-22 | Stop reason: HOSPADM

## 2025-03-21 RX ORDER — OXYTOCIN/0.9 % SODIUM CHLORIDE 30/500 ML
2-30 PLASTIC BAG, INJECTION (ML) INTRAVENOUS CONTINUOUS
Status: DISCONTINUED | OUTPATIENT
Start: 2025-03-21 | End: 2025-03-22

## 2025-03-21 RX ORDER — TRANEXAMIC ACID 100 MG/ML
1000 INJECTION, SOLUTION INTRAVENOUS ONCE AS NEEDED
Status: DISCONTINUED | OUTPATIENT
Start: 2025-03-21 | End: 2025-03-22 | Stop reason: HOSPADM

## 2025-03-21 RX ORDER — NALBUPHINE HYDROCHLORIDE 10 MG/ML
10 INJECTION INTRAMUSCULAR; INTRAVENOUS; SUBCUTANEOUS ONCE
Status: COMPLETED | OUTPATIENT
Start: 2025-03-21 | End: 2025-03-21

## 2025-03-21 RX ADMIN — MISOPROSTOL 25 MCG: 100 TABLET ORAL at 20:13

## 2025-03-21 RX ADMIN — SODIUM CHLORIDE, POTASSIUM CHLORIDE, SODIUM LACTATE AND CALCIUM CHLORIDE 75 ML/HR: 600; 310; 30; 20 INJECTION, SOLUTION INTRAVENOUS at 18:40

## 2025-03-21 RX ADMIN — NALBUPHINE HYDROCHLORIDE 10 MG: 10 INJECTION, SOLUTION INTRAMUSCULAR; INTRAVENOUS; SUBCUTANEOUS at 19:44

## 2025-03-21 SDOH — ECONOMIC STABILITY: FOOD INSECURITY

## 2025-03-21 SDOH — ECONOMIC STABILITY: FOOD INSECURITY: WITHIN THE PAST 12 MONTHS, THE FOOD YOU BOUGHT JUST DIDN'T LAST AND YOU DIDN'T HAVE MONEY TO GET MORE.: NEVER TRUE

## 2025-03-21 SDOH — ECONOMIC STABILITY: FOOD INSECURITY: WITHIN THE PAST 12 MONTHS, YOU WORRIED THAT YOUR FOOD WOULD RUN OUT BEFORE YOU GOT MONEY TO BUY MORE.: NEVER TRUE

## 2025-03-21 SDOH — SOCIAL STABILITY: SOCIAL INSECURITY: WITHIN THE LAST YEAR, HAVE YOU BEEN HUMILIATED OR EMOTIONALLY ABUSED IN OTHER WAYS BY YOUR PARTNER OR EX-PARTNER?: NO

## 2025-03-21 SDOH — SOCIAL STABILITY: SOCIAL INSECURITY: HAS ANYONE EVER THREATENED TO HURT YOUR FAMILY OR YOUR PETS?: NO

## 2025-03-21 SDOH — ECONOMIC STABILITY: HOUSING INSECURITY: DO YOU FEEL UNSAFE GOING BACK TO THE PLACE WHERE YOU ARE LIVING?: NO

## 2025-03-21 SDOH — SOCIAL STABILITY: SOCIAL INSECURITY
WITHIN THE LAST YEAR, HAVE YOU BEEN KICKED, HIT, SLAPPED, OR OTHERWISE PHYSICALLY HURT BY YOUR PARTNER OR EX-PARTNER?: NO

## 2025-03-21 SDOH — SOCIAL STABILITY: SOCIAL INSECURITY: WITHIN THE LAST YEAR, HAVE YOU BEEN AFRAID OF YOUR PARTNER OR EX-PARTNER?: NO

## 2025-03-21 SDOH — HEALTH STABILITY: MENTAL HEALTH: SUICIDAL BEHAVIOR (LIFETIME): NO

## 2025-03-21 SDOH — SOCIAL STABILITY: SOCIAL INSECURITY: ARE YOU OR HAVE YOU BEEN THREATENED OR ABUSED PHYSICALLY, EMOTIONALLY, OR SEXUALLY BY ANYONE?: NO

## 2025-03-21 SDOH — SOCIAL STABILITY: SOCIAL INSECURITY
WITHIN THE LAST YEAR, HAVE YOU BEEN RAPED OR FORCED TO HAVE ANY KIND OF SEXUAL ACTIVITY BY YOUR PARTNER OR EX-PARTNER?: NO

## 2025-03-21 SDOH — ECONOMIC STABILITY: FOOD INSECURITY: WITHIN THE PAST 12 MONTHS, YOU WORRIED THAT YOUR FOOD WOULD RUN OUT BEFORE YOU GOT THE MONEY TO BUY MORE.: NEVER TRUE

## 2025-03-21 SDOH — HEALTH STABILITY: MENTAL HEALTH: WERE YOU ABLE TO COMPLETE ALL THE BEHAVIORAL HEALTH SCREENINGS?: YES

## 2025-03-21 SDOH — ECONOMIC STABILITY: TRANSPORTATION INSECURITY: IN THE PAST 12 MONTHS, HAS LACK OF TRANSPORTATION KEPT YOU FROM MEDICAL APPOINTMENTS OR FROM GETTING MEDICATIONS?: NO

## 2025-03-21 SDOH — SOCIAL STABILITY: SOCIAL INSECURITY: VERBAL ABUSE: DENIES

## 2025-03-21 SDOH — ECONOMIC STABILITY: TRANSPORTATION INSECURITY
IN THE PAST 12 MONTHS, HAS THE LACK OF TRANSPORTATION KEPT YOU FROM MEDICAL APPOINTMENTS OR FROM GETTING MEDICATIONS?: NO

## 2025-03-21 SDOH — SOCIAL STABILITY: SOCIAL INSECURITY: PHYSICAL ABUSE: DENIES

## 2025-03-21 SDOH — HEALTH STABILITY: MENTAL HEALTH: CURRENT SMOKER: 0

## 2025-03-21 SDOH — SOCIAL STABILITY: SOCIAL INSECURITY: HAVE YOU HAD THOUGHTS OF HARMING ANYONE ELSE?: NO

## 2025-03-21 SDOH — SOCIAL STABILITY: SOCIAL INSECURITY: HAVE YOU HAD ANY THOUGHTS OF HARMING ANYONE ELSE?: NO

## 2025-03-21 SDOH — ECONOMIC STABILITY: TRANSPORTATION INSECURITY
IN THE PAST 12 MONTHS, HAS LACK OF TRANSPORTATION KEPT YOU FROM MEETINGS, WORK, OR FROM GETTING THINGS NEEDED FOR DAILY LIVING?: NO

## 2025-03-21 SDOH — SOCIAL STABILITY: SOCIAL INSECURITY: ABUSE SCREEN: ADULT

## 2025-03-21 SDOH — ECONOMIC STABILITY: FOOD INSECURITY: HOW HARD IS IT FOR YOU TO PAY FOR THE VERY BASICS LIKE FOOD, HOUSING, MEDICAL CARE, AND HEATING?: NOT HARD AT ALL

## 2025-03-21 SDOH — HEALTH STABILITY: MENTAL HEALTH: NON-SPECIFIC ACTIVE SUICIDAL THOUGHTS (PAST 1 MONTH): NO

## 2025-03-21 SDOH — HEALTH STABILITY: MENTAL HEALTH: WISH TO BE DEAD (PAST 1 MONTH): NO

## 2025-03-21 SDOH — SOCIAL STABILITY: SOCIAL INSECURITY: DOES ANYONE TRY TO KEEP YOU FROM HAVING/CONTACTING OTHER FRIENDS OR DOING THINGS OUTSIDE YOUR HOME?: NO

## 2025-03-21 SDOH — SOCIAL STABILITY: SOCIAL INSECURITY: DO YOU FEEL ANYONE HAS EXPLOITED OR TAKEN ADVANTAGE OF YOU FINANCIALLY OR OF YOUR PERSONAL PROPERTY?: NO

## 2025-03-21 SDOH — SOCIAL STABILITY: SOCIAL INSECURITY: ARE THERE ANY APPARENT SIGNS OF INJURIES/BEHAVIORS THAT COULD BE RELATED TO ABUSE/NEGLECT?: NO

## 2025-03-21 SDOH — ECONOMIC STABILITY: TRANSPORTATION INSECURITY

## 2025-03-21 ASSESSMENT — PAIN SCALES - GENERAL
PAINLEVEL_OUTOF10: 0 - NO PAIN
PAINLEVEL_OUTOF10: 0 - NO PAIN
PAINLEVEL_OUTOF10: 3
PAINLEVEL_OUTOF10: 0 - NO PAIN
PAINLEVEL_OUTOF10: 5 - MODERATE PAIN
PAINLEVEL_OUTOF10: 3
PAINLEVEL_OUTOF10: 4

## 2025-03-21 ASSESSMENT — ACTIVITIES OF DAILY LIVING (ADL)
WALKS_IN_HOME: INDEPENDENTLY
LACK_OF_TRANSPORTATION: NO
ADL_BEFORE_ADMISSION: INDEPENDENTLY
ADEQUATE_TO_COMPLETE_ADL: YES
HOW_WELL_CAN_YOU_DRESS_YOURSELF: INDEPENDENTLY
ADL_BEFORE_ADMISSION: RIGHT
HOW_WELL_CAN_YOU_USE_BATHROOM_BY_YOURSELF: INDEPENDENTLY
LACK_OF_TRANSPORTATION: NO
HEARING_LEFT_EAR: NO PROBLEMS
HEARING_RIGHT_EAR: NO PROBLEMS
HOW_WELL_CAN_YOU_FEED_YOURSELF: INDEPENDENTLY
HOW_WELL_CAN_YOU_COMPLETE_GROOMING_TASKS: INDEPENDENTLY
TOILETING: INDEPENDENT
FEEDING: INDEPENDENT
DRESSING: INDEPENDENT
ADEQUATE_TO_COMPLETE_ADL: YES
HOW_WELL_CAN_YOU_BATHE_YOURSELF: INDEPENDENTLY
BATHING: INDEPENDENT

## 2025-03-21 ASSESSMENT — LIFESTYLE VARIABLES
HOW OFTEN DO YOU HAVE A DRINK CONTAINING ALCOHOL: NEVER
SKIP TO QUESTIONS 9-10: 1
AUDIT-C TOTAL SCORE: 0
AUDIT-C TOTAL SCORE: 0
HOW OFTEN DO YOU HAVE 6 OR MORE DRINKS ON ONE OCCASION: NEVER
HOW MANY STANDARD DRINKS CONTAINING ALCOHOL DO YOU HAVE ON A TYPICAL DAY: PATIENT DOES NOT DRINK

## 2025-03-21 ASSESSMENT — PAIN DESCRIPTION - LOCATION: LOCATION: ABDOMEN

## 2025-03-21 ASSESSMENT — PATIENT HEALTH QUESTIONNAIRE - PHQ9
1. LITTLE INTEREST OR PLEASURE IN DOING THINGS: NOT AT ALL
2. FEELING DOWN, DEPRESSED OR HOPELESS: NOT AT ALL
SUM OF ALL RESPONSES TO PHQ9 QUESTIONS 1 & 2: 0

## 2025-03-21 NOTE — H&P
OB Admission H&P    Assessment/Plan    Yael Schmidt is a 29 y.o.  at 38w0d, MARCO: 2025, by Last Menstrual Period, who presents for Induction of Labor in the setting of chronic hypertension.    Chronic Hypertension  No medications  Headache: resolved w/ tylenol, reglan, benadryl  HELLP labs normal on admission, normal at baseline   P:C 0.12    Plan  -Admit to L&D, consented  -T&S, CBC, and Syphilis  -Epidural at patient request  -Recheck as clinically indicated by maternal or fetal status  -Plan to initiate induction with cytotec    Fetal Status  -NST reactive, reassuring   -Presentation cephalic based on ultrasound  -EFW 3200 g ext from 34w5d , was 2410g 36%, AC 39%  -GBS negative    Postpartum  Contraception Plan: defer to PPV. Declines contraception immediately PP, plans for lactational amenorrhea and then possibly IUD vs cOCP in the future    Subjective   Good fetal movement.  No VB, LOF, but occasional contractions   No hx asthma or other health conditions  No hx surgeries    Prenatal Provider Peter    OB History    Para Term  AB Living   1 0 0 0 0 0   SAB IAB Ectopic Multiple Live Births   0 0 0 0 0      # Outcome Date GA Lbr Ramin/2nd Weight Sex Type Anes PTL Lv   1 Current                Past Surgical History:   Procedure Laterality Date    WISDOM TOOTH EXTRACTION         Social History     Tobacco Use    Smoking status: Never    Smokeless tobacco: Never   Substance Use Topics    Alcohol use: Not Currently       Allergies   Allergen Reactions    Doxycycline Other     GI UPset       Medications Prior to Admission   Medication Sig Dispense Refill Last Dose/Taking    aspirin 81 mg EC tablet Take 1 tablet (81 mg) by mouth once daily.   Past Month    prenatal vit37/iron/folic acid (PRENATA ORAL) Take by mouth.   3/20/2025     Objective     Last Vitals  Temp Pulse Resp BP MAP O2 Sat   36.6 °C (97.9 °F) 71 18 (!) 144/74 101 96 %     Blood Pressures         3/21/2025  1738  3/21/2025  1935 3/21/2025  2027       BP: 142/76 145/84 144/74              Physical Exam  General: NAD, mood appropriate  Cardiopulmonary: warm and well perfused, breathing comfortably on room air  Abdomen: Gravid, non-tender  Extremities: Symmetric  Speculum Exam: deferred  Cervix: 2 /50 /-3. Cytotec #1 placed at 2015.     Fetal Monitoring  Baseline: 145 bpm, Variability: moderate,  Accelerations: present and Decelerations: none  Uterine Activity: Irregular contractions  Interpretation: Reactive    BSUS:   Position: Cephalic      Results from last 7 days   Lab Units 03/21/25  1801   WBC AUTO x10*3/uL 15.2*   HEMOGLOBIN g/dL 11.6*   HEMATOCRIT % 35.4*   PLATELETS AUTO x10*3/uL 259   AST U/L 8*   ALT U/L 9   CREATININE mg/dL 0.69

## 2025-03-22 ENCOUNTER — ANESTHESIA EVENT (OUTPATIENT)
Dept: OBSTETRICS AND GYNECOLOGY | Facility: HOSPITAL | Age: 30
End: 2025-03-22
Payer: COMMERCIAL

## 2025-03-22 ENCOUNTER — ANESTHESIA (OUTPATIENT)
Dept: OBSTETRICS AND GYNECOLOGY | Facility: HOSPITAL | Age: 30
End: 2025-03-22
Payer: COMMERCIAL

## 2025-03-22 PROCEDURE — 1100000001 HC PRIVATE ROOM DAILY

## 2025-03-22 PROCEDURE — 2500000005 HC RX 250 GENERAL PHARMACY W/O HCPCS

## 2025-03-22 PROCEDURE — 01967 NEURAXL LBR ANES VAG DLVR: CPT | Performed by: ANESTHESIOLOGY

## 2025-03-22 PROCEDURE — 0KQM0ZZ REPAIR PERINEUM MUSCLE, OPEN APPROACH: ICD-10-PCS | Performed by: STUDENT IN AN ORGANIZED HEALTH CARE EDUCATION/TRAINING PROGRAM

## 2025-03-22 PROCEDURE — 2500000004 HC RX 250 GENERAL PHARMACY W/ HCPCS (ALT 636 FOR OP/ED)

## 2025-03-22 PROCEDURE — 59400 OBSTETRICAL CARE: CPT

## 2025-03-22 PROCEDURE — 2720000007 HC OR 272 NO HCPCS

## 2025-03-22 PROCEDURE — 7100000016 HC LABOR RECOVERY PER HOUR

## 2025-03-22 PROCEDURE — 3E033VJ INTRODUCTION OF OTHER HORMONE INTO PERIPHERAL VEIN, PERCUTANEOUS APPROACH: ICD-10-PCS

## 2025-03-22 PROCEDURE — 51701 INSERT BLADDER CATHETER: CPT

## 2025-03-22 PROCEDURE — 88307 TISSUE EXAM BY PATHOLOGIST: CPT | Mod: TC,SUR,WESLAB | Performed by: STUDENT IN AN ORGANIZED HEALTH CARE EDUCATION/TRAINING PROGRAM

## 2025-03-22 PROCEDURE — 7210000002 HC LABOR PER HOUR

## 2025-03-22 PROCEDURE — 88307 TISSUE EXAM BY PATHOLOGIST: CPT | Performed by: PATHOLOGY

## 2025-03-22 PROCEDURE — 1120000001 HC OB PRIVATE ROOM DAILY

## 2025-03-22 PROCEDURE — 2500000001 HC RX 250 WO HCPCS SELF ADMINISTERED DRUGS (ALT 637 FOR MEDICARE OP)

## 2025-03-22 PROCEDURE — 3700000014 EPIDURAL BLOCK: Mod: GC

## 2025-03-22 PROCEDURE — 10907ZC DRAINAGE OF AMNIOTIC FLUID, THERAPEUTIC FROM PRODUCTS OF CONCEPTION, VIA NATURAL OR ARTIFICIAL OPENING: ICD-10-PCS

## 2025-03-22 PROCEDURE — 59409 OBSTETRICAL CARE: CPT | Performed by: STUDENT IN AN ORGANIZED HEALTH CARE EDUCATION/TRAINING PROGRAM

## 2025-03-22 RX ORDER — NALBUPHINE HYDROCHLORIDE 10 MG/ML
10 INJECTION INTRAMUSCULAR; INTRAVENOUS; SUBCUTANEOUS
Status: DISCONTINUED | OUTPATIENT
Start: 2025-03-22 | End: 2025-03-22

## 2025-03-22 RX ORDER — SIMETHICONE 80 MG
80 TABLET,CHEWABLE ORAL 4 TIMES DAILY PRN
Status: DISCONTINUED | OUTPATIENT
Start: 2025-03-22 | End: 2025-03-23

## 2025-03-22 RX ORDER — LOPERAMIDE HYDROCHLORIDE 2 MG/1
4 CAPSULE ORAL EVERY 2 HOUR PRN
Status: DISCONTINUED | OUTPATIENT
Start: 2025-03-22 | End: 2025-03-25

## 2025-03-22 RX ORDER — OXYTOCIN 10 [USP'U]/ML
10 INJECTION, SOLUTION INTRAMUSCULAR; INTRAVENOUS ONCE AS NEEDED
Status: DISCONTINUED | OUTPATIENT
Start: 2025-03-22 | End: 2025-03-25 | Stop reason: HOSPADM

## 2025-03-22 RX ORDER — ENOXAPARIN SODIUM 100 MG/ML
40 INJECTION SUBCUTANEOUS EVERY 24 HOURS
Status: DISCONTINUED | OUTPATIENT
Start: 2025-03-23 | End: 2025-03-25 | Stop reason: HOSPADM

## 2025-03-22 RX ORDER — LABETALOL HYDROCHLORIDE 5 MG/ML
20 INJECTION, SOLUTION INTRAVENOUS ONCE AS NEEDED
Status: DISCONTINUED | OUTPATIENT
Start: 2025-03-22 | End: 2025-03-25 | Stop reason: HOSPADM

## 2025-03-22 RX ORDER — LIDOCAINE 560 MG/1
1 PATCH PERCUTANEOUS; TOPICAL; TRANSDERMAL DAILY
Status: DISCONTINUED | OUTPATIENT
Start: 2025-03-22 | End: 2025-03-25 | Stop reason: HOSPADM

## 2025-03-22 RX ORDER — IBUPROFEN 600 MG/1
600 TABLET ORAL EVERY 6 HOURS
Status: DISCONTINUED | OUTPATIENT
Start: 2025-03-22 | End: 2025-03-25 | Stop reason: HOSPADM

## 2025-03-22 RX ORDER — LIDOCAINE HYDROCHLORIDE AND EPINEPHRINE 15; 5 MG/ML; UG/ML
INJECTION, SOLUTION EPIDURAL AS NEEDED
Status: DISCONTINUED | OUTPATIENT
Start: 2025-03-22 | End: 2025-03-22

## 2025-03-22 RX ORDER — HYDRALAZINE HYDROCHLORIDE 20 MG/ML
5 INJECTION INTRAMUSCULAR; INTRAVENOUS ONCE AS NEEDED
Status: DISCONTINUED | OUTPATIENT
Start: 2025-03-22 | End: 2025-03-25 | Stop reason: HOSPADM

## 2025-03-22 RX ORDER — MISOPROSTOL 200 UG/1
800 TABLET ORAL ONCE AS NEEDED
Status: DISCONTINUED | OUTPATIENT
Start: 2025-03-22 | End: 2025-03-25 | Stop reason: HOSPADM

## 2025-03-22 RX ORDER — OXYTOCIN/0.9 % SODIUM CHLORIDE 30/500 ML
60 PLASTIC BAG, INJECTION (ML) INTRAVENOUS ONCE AS NEEDED
Status: DISCONTINUED | OUTPATIENT
Start: 2025-03-22 | End: 2025-03-25 | Stop reason: HOSPADM

## 2025-03-22 RX ORDER — CARBOPROST TROMETHAMINE 250 UG/ML
250 INJECTION, SOLUTION INTRAMUSCULAR ONCE AS NEEDED
Status: DISCONTINUED | OUTPATIENT
Start: 2025-03-22 | End: 2025-03-25 | Stop reason: HOSPADM

## 2025-03-22 RX ORDER — DIPHENHYDRAMINE HYDROCHLORIDE 50 MG/ML
25 INJECTION, SOLUTION INTRAMUSCULAR; INTRAVENOUS EVERY 6 HOURS PRN
Status: DISCONTINUED | OUTPATIENT
Start: 2025-03-22 | End: 2025-03-25 | Stop reason: HOSPADM

## 2025-03-22 RX ORDER — ADHESIVE BANDAGE
10 BANDAGE TOPICAL
Status: DISCONTINUED | OUTPATIENT
Start: 2025-03-22 | End: 2025-03-25 | Stop reason: HOSPADM

## 2025-03-22 RX ORDER — TRANEXAMIC ACID 100 MG/ML
1000 INJECTION, SOLUTION INTRAVENOUS ONCE AS NEEDED
Status: DISCONTINUED | OUTPATIENT
Start: 2025-03-22 | End: 2025-03-25 | Stop reason: HOSPADM

## 2025-03-22 RX ORDER — POLYETHYLENE GLYCOL 3350 17 G/17G
17 POWDER, FOR SOLUTION ORAL 2 TIMES DAILY PRN
Status: DISCONTINUED | OUTPATIENT
Start: 2025-03-22 | End: 2025-03-23

## 2025-03-22 RX ORDER — ONDANSETRON HYDROCHLORIDE 2 MG/ML
4 INJECTION, SOLUTION INTRAVENOUS EVERY 6 HOURS PRN
Status: DISCONTINUED | OUTPATIENT
Start: 2025-03-22 | End: 2025-03-25 | Stop reason: HOSPADM

## 2025-03-22 RX ORDER — ACETAMINOPHEN 325 MG/1
975 TABLET ORAL EVERY 6 HOURS
Status: DISCONTINUED | OUTPATIENT
Start: 2025-03-22 | End: 2025-03-25 | Stop reason: HOSPADM

## 2025-03-22 RX ORDER — METHYLERGONOVINE MALEATE 0.2 MG/ML
0.2 INJECTION INTRAVENOUS ONCE AS NEEDED
Status: DISCONTINUED | OUTPATIENT
Start: 2025-03-22 | End: 2025-03-25 | Stop reason: HOSPADM

## 2025-03-22 RX ORDER — ONDANSETRON 4 MG/1
4 TABLET, FILM COATED ORAL EVERY 6 HOURS PRN
Status: DISCONTINUED | OUTPATIENT
Start: 2025-03-22 | End: 2025-03-25 | Stop reason: HOSPADM

## 2025-03-22 RX ORDER — DIPHENHYDRAMINE HCL 25 MG
25 CAPSULE ORAL EVERY 6 HOURS PRN
Status: DISCONTINUED | OUTPATIENT
Start: 2025-03-22 | End: 2025-03-25 | Stop reason: HOSPADM

## 2025-03-22 RX ADMIN — ONDANSETRON 4 MG: 2 INJECTION INTRAMUSCULAR; INTRAVENOUS at 12:29

## 2025-03-22 RX ADMIN — Medication 5 ML: at 05:46

## 2025-03-22 RX ADMIN — WITCH HAZEL 1 EACH: 5 CLOTH TOPICAL at 18:23

## 2025-03-22 RX ADMIN — IBUPROFEN 600 MG: 600 TABLET, FILM COATED ORAL at 22:29

## 2025-03-22 RX ADMIN — ACETAMINOPHEN 975 MG: 325 TABLET ORAL at 22:29

## 2025-03-22 RX ADMIN — BENZOCAINE AND LEVOMENTHOL 1 APPLICATION: 200; 5 SPRAY TOPICAL at 18:22

## 2025-03-22 RX ADMIN — LIDOCAINE 4% 1 PATCH: 40 PATCH TOPICAL at 18:37

## 2025-03-22 RX ADMIN — Medication 2 MILLI-UNITS/MIN: at 01:16

## 2025-03-22 RX ADMIN — IBUPROFEN 600 MG: 600 TABLET, FILM COATED ORAL at 16:07

## 2025-03-22 RX ADMIN — SODIUM CHLORIDE, POTASSIUM CHLORIDE, SODIUM LACTATE AND CALCIUM CHLORIDE 500 ML: 600; 310; 30; 20 INJECTION, SOLUTION INTRAVENOUS at 05:32

## 2025-03-22 RX ADMIN — NALBUPHINE HYDROCHLORIDE 10 MG: 10 INJECTION, SOLUTION INTRAMUSCULAR; INTRAVENOUS; SUBCUTANEOUS at 00:42

## 2025-03-22 RX ADMIN — Medication 60 MILLI-UNITS/MIN: at 14:41

## 2025-03-22 RX ADMIN — LIDOCAINE HYDROCHLORIDE AND EPINEPHRINE 3 ML: 15; 5 INJECTION, SOLUTION EPIDURAL at 05:42

## 2025-03-22 RX ADMIN — Medication 10 ML/HR: at 05:48

## 2025-03-22 RX ADMIN — ACETAMINOPHEN 975 MG: 325 TABLET ORAL at 16:07

## 2025-03-22 SDOH — HEALTH STABILITY: MENTAL HEALTH: CURRENT SMOKER: 0

## 2025-03-22 ASSESSMENT — PAIN SCALES - GENERAL
PAINLEVEL_OUTOF10: 0 - NO PAIN
PAINLEVEL_OUTOF10: 1
PAINLEVEL_OUTOF10: 5 - MODERATE PAIN
PAINLEVEL_OUTOF10: 0 - NO PAIN
PAINLEVEL_OUTOF10: 6
PAINLEVEL_OUTOF10: 0 - NO PAIN
PAINLEVEL_OUTOF10: 6
PAINLEVEL_OUTOF10: 1
PAINLEVEL_OUTOF10: 5 - MODERATE PAIN
PAINLEVEL_OUTOF10: 0 - NO PAIN
PAINLEVEL_OUTOF10: 10 - WORST POSSIBLE PAIN
PAINLEVEL_OUTOF10: 6
PAINLEVEL_OUTOF10: 0 - NO PAIN
PAINLEVEL_OUTOF10: 0 - NO PAIN
PAINLEVEL_OUTOF10: 1
PAINLEVEL_OUTOF10: 6

## 2025-03-22 ASSESSMENT — PAIN DESCRIPTION - LOCATION: LOCATION: ABDOMEN

## 2025-03-22 ASSESSMENT — PAIN DESCRIPTION - DESCRIPTORS
DESCRIPTORS: SHARP
DESCRIPTORS: ACHING;SHARP;SORE

## 2025-03-22 NOTE — SIGNIFICANT EVENT
Patient in second stage of labor, started pushing about about 45 minutes ago. Tracing is Cat I currently. Pt making significant progress on my exam with adequate maternal effort. 1+ station currently.     Anticipate .    Liz Ray MD

## 2025-03-22 NOTE — ANESTHESIA PREPROCEDURE EVALUATION
Patient: Yael Schmidt    Evaluation Method: In-person visit    Procedure Information    Date: 03/21/25  Procedure: Labor Consult         Relevant Problems   Anesthesia  Never had GA      Cardiac   (+) Chronic hypertension in pregnancy (HHS-HCC)      Pulmonary (within normal limits)      Neuro (within normal limits)      GI   (+) Gastroesophageal reflux disease      /Renal (within normal limits)      Liver (within normal limits)      Hematology (within normal limits)      Musculoskeletal (within normal limits)      GYN   (+) 36 weeks gestation of pregnancy (HHS-HCC)       Clinical information reviewed:    Allergies  Meds               NPO Detail:  No data recorded     OB/Gyn Evaluation    Present Pregnancy    Patient is pregnant now.  (+) , hypertensive disorder of pregnancy - gestational hypertension   Obstetric History                Physical Exam    Airway  Mallampati: II     Cardiovascular   Rhythm: regular  Rate: normal     Dental - normal exam     Pulmonary    Abdominal   (+) obese             Anesthesia Plan    History of general anesthesia?: no  History of complications of general anesthesia?: unknown/emergency    ASA 3     epidural     The patient is not a current smoker.    Anesthetic plan and risks discussed with patient.    Plan discussed with resident.

## 2025-03-22 NOTE — ANESTHESIA PROCEDURE NOTES
Epidural Block    Patient location during procedure: OB  Start time: 3/22/2025 5:31 AM  End time: 3/22/2025 5:50 AM  Reason for block: labor analgesia  Staffing  Performed: resident   Authorized by: Chay Schneider DO    Performed by: Alina Abarca DO    Preanesthetic Checklist  Completed: patient identified, IV checked, risks and benefits discussed, surgical consent, monitors and equipment checked, pre-op evaluation, timeout performed and sterile techniques followed  Block Timeout  RN/Licensed healthcare professional reads aloud to the Anesthesia provider and entire team: Patient identity, procedure with side and site, patient position, and as applicable the availability of implants/special equipment/special requirements.  Patient on coagulant treatment: no  Timeout performed at: 3/22/2025 5:30 AM  Block Placement  Patient position: sitting  Prep: ChloraPrep  Sterility prep: cap, drape, gloves and mask  Sedation level: no sedation  Patient monitoring: blood pressure, continuous pulse oximetry and heart rate  Approach: midline  Local numbing: lidocaine 1% to skin and subcutaneous tissues  Vertebral space: lumbar  Lumbar location: L3-L4  Epidural  Loss of resistance technique: saline  Guidance: landmark technique        Needle  Needle type: Tuohy   Needle gauge: 17  Needle length: 8.9cm  Needle insertion depth: 6.5 cm  Catheter type: multi-orifice  Catheter size: 19 G  Catheter at skin depth: 12 cm  Catheter securement method: clear occlusive dressing and liquid medical adhesive    Test dose: lidocaine 1.5% with epinephrine 1-to-200,000  Test dose: lidocaine 1.5% with epinephrine 1-to-200,000  Test dose result: no positive test dose    PCEA  Medication concentration used: 0.2% Ropivacaine with 2 mcg/mL Fentanyl  Dose (mL): 5  Lockout (minutes): 30  1-Hour Limit (boluses/hr): 2  Basal Rate: 10        Assessment  Sensory level: T10 bilateral  Block outcome: patient comfortable  Number of attempts: 1  Events:  paresthesia and no positive test dose  Paresthesia resolved: yes  Procedure assessment: patient tolerated procedure well with no immediate complications  Additional Notes  YEVGENIY at 6.5 cm. Catheter secured at 12 cm at skin.  1 attempt tolerated well by pt.   Some cramping paresthesias felt when withdrawing the catheter to 12cm at skin, self-resolved.

## 2025-03-22 NOTE — SIGNIFICANT EVENT
"Labor Progress Note    Subjective: Resting comfortably with intermittently painful contractions, no concerns currently. Amenable to cervical check and AROM if appropriate.    Objective:  Vitals: BP (!) 150/73   Pulse 77   Temp 36.3 °C (97.3 °F) (Temporal)   Resp 18   Ht 1.676 m (5' 6\")   Wt 107 kg (235 lb 14.3 oz)   LMP 06/28/2024 (Approximate)   SpO2 98%   BMI 38.07 kg/m²     FHT: 135/moderate/+accels/-decels   [place]: 1-2 min    A/P:  AROM performed for clear fluid  CEFM, currently Category I  Epidural as requested     Ayana Cobb MD, MPH  Obstetrics & Gynecology, PGY-1    "

## 2025-03-22 NOTE — L&D DELIVERY NOTE
Vaginal Delivery Note    Patient Name: Yael Schmidt  : 1995  MRN: [id number]  Age: 29 y.o.    /Para:   Gestational Age: 38w1d    Date of Delivery: 3/22/2025    Procedure: Normal Spontaneous Vaginal Delivery    Delivery Provider: Liz Ray MD    Resident/Fellow/Other Assistant: Vi Bond MD    Description of Procedure:  Delivery of viable infant under epidural anesthesia. Delayed clamping was performed. The infant was placed skin to skin.. Cord gases were not sent.  Cord blood was collected. Placenta delivered intact and fundus was firm.     2nd degree laceration identified and repaired.     Findings:   Amniotic fluid Clear, Female infant in Vertex     presentation, APGARS 8 , 9 .  Birth Weight 2.97 kg.    Complications: None    Quantitative Blood Loss:   Delivery Blood Loss   Intrapartum & Postpartum: 25 - 25    Delivery Admission: 25 - 25         Intrapartum & Postpartum Delivery Admission    Est. Blood Loss Hospital Encounter 225 mL 225 mL    Total  225 mL 225 mL            Blood products:      Uterotonics/Hemostatic Agent: IV Pitocin 30 units    Specimen:   Placenta  Delivered: 3/22/2025  2:42 PM  Appearance: Intact  Removal: Expressed    Disposition: pathology    Sponge/Instrument/Needle Counts: The sponge, lap and needle counts were correct.    Patient Disposition: Patient recovering on labor and delivery in stable condition.    Additional Procedures:  None    Sarika Schmidt [[id number]]      Labor Events    Sac identifier: Sac 1  Rupture date/time: 3/22/2025 0450  Rupture type: Artificial  Fluid color: Clear  Fluid odor: None  Labor type: Induced Onset of Labor  Labor allowed to proceed with plans for an attempted vaginal birth?: Yes  Induction indications: Hypertensive Disorder of Pregnancy  Complications: None       Labor Event Times    Labor onset date/time: 3/21/2025 1643  Dilation complete date/time: 3/22/2025  1230  Start pushing date/time: 3/22/2025 1238       Labor Length    1st stage: 19h 47m  2nd stage: 2h 08m  3rd stage: 0h 04m       Placenta    Placenta delivery date/time: 3/22/2025 1442  Placenta removal: Expressed  Placenta appearance: Intact  Placenta disposition: pathology       Cord    Vessels: 3 vessels  Delayed cord clamping?: Yes  Cord clamped date/time: 3/22/2025 14:43:08  Cord blood disposition: Lab, Discarded  Gases sent?: No  Stem cell collection (by provider): No       Lacerations    Episiotomy: None  Perineal laceration: 2nd  Perineal laceration repaired?: Yes  Vaginal laceration?: Yes  Vaginal laceration location: right  Vaginal laceration repaired?: No  Repair suture: 2-0 Synthetic Suture       Anesthesia    Method: Epidural       Operative Delivery    Forceps attempted?: No  Vacuum extractor attempted?: No       Shoulder Dystocia    Shoulder dystocia present?: No        Delivery    Birth date/time: 3/22/2025 14:38:00  Delivery type: Vaginal, Spontaneous  Complications: None       Resuscitation    Method: Suctioning, Tactile stimulation       Apgars    Living status: Living  Apgar Component Scores:  1 min.:  5 min.:  10 min.:  15 min.:  20 min.:    Skin color:  0  1       Heart rate:  2  2       Reflex irritability:  2  2       Muscle tone:  2  2       Respiratory effort:  2  2       Total:  8  9       Apgars assigned by: DEVEN SCHAFER       Delivery Providers    Delivering clinician: Liz Ray MD   Provider Role    Lynn Rice RN Delivery Nurse    Deven Schafer, RN Nursery Nurse    Vi Bond MD Resident

## 2025-03-22 NOTE — SIGNIFICANT EVENT
"Labor Progress Note    Subjective: Pt reports intermittent painful contractions    Objective:  Vitals: BP (!) 142/67   Pulse 77   Temp 36.5 °C (97.7 °F) (Temporal)   Resp 18   Ht 1.676 m (5' 6\")   Wt 107 kg (235 lb 14.3 oz)   LMP 06/28/2024 (Approximate)   SpO2 97%   BMI 38.07 kg/m²     SVE: 3/60/-3  FHT: 130/moderate/+accels/-decels    [place]: q 2 min    A/P:  To start pitocin, augment per protocol  CEFM, currently Category I  Epidural as requested    Ayana Cobb MD, MPH  Obstetrics & Gynecology, PGY-1   "

## 2025-03-22 NOTE — PROGRESS NOTES
Intrapartum Progress Note    Assessment/Plan   Yael Schmidt is a 29 y.o.  at 38w1d, MARCO: 2025, by Last Menstrual Period cw 11w US admitted for IOL in the s/o cHTN.     IOL  Method: pit  Pain management: epidural infusing  CEFM, currently Category I  GBS: neg  Next exam: Will recheck as clinically indicated by maternal or fetal status or per pt request     cHTN  No meds   Normotensive to low MRBP   Asymptomatic     Seen and discussed with Dr. Cory Bond MD PGY-1   OBGYN    Subjective   Pt is doing well overall. Not having a lot of sensation with her epidural in and wanting to be checked to see if she made any progress after having her water broken four hours ago. Feeling well without complaints. Denies HA, CP, SOB, RUQ pain and vision changes.     Objective   Last Vitals:  Temp Pulse Resp BP MAP Pulse Ox   36.4 °C (97.5 °F) 83 18 124/75   98 %     Vitals Min/Max Last 24 Hours:  Temp  Min: 36 °C (96.8 °F)  Max: 36.6 °C (97.9 °F)  Pulse  Min: 66  Max: 86  Resp  Min: 16  Max: 20  BP  Min: 124/75  Max: 153/74    Intake/Output:    Intake/Output Summary (Last 24 hours) at 3/22/2025 0916  Last data filed at 3/22/2025 0831  Gross per 24 hour   Intake 5 ml   Output 175 ml   Net -170 ml       Physical Examination:  General: no acute distress  HEENT: normocephalic, atraumatic  Heart: warm and well perfused  Lungs: breathing comfortably on room air  Abdomen: gravid  Extremities: moving all extremities  Neuro: awake and conversant  Psych: appropriate mood and affect    SVE: 60/-3  FHT: 130/mod/+accel/-decel  [place]: q2-3min    Lab Review:  Labs in chart have been reviewed  Hb 11.6

## 2025-03-22 NOTE — CARE PLAN
The patient's goals for the shift include relax and bond with baby    The clinical goals for the shift include maintain VS WDL    Over the shift, the patient did not make progress toward the following goals. Barriers to progression include increased BP. Recommendations to address these barriers include Rest and continued monitoring, OB aware.

## 2025-03-23 VITALS
DIASTOLIC BLOOD PRESSURE: 83 MMHG | HEIGHT: 66 IN | BODY MASS INDEX: 37.91 KG/M2 | SYSTOLIC BLOOD PRESSURE: 137 MMHG | WEIGHT: 235.89 LBS | TEMPERATURE: 97 F | RESPIRATION RATE: 16 BRPM | HEART RATE: 79 BPM | OXYGEN SATURATION: 96 %

## 2025-03-23 PROCEDURE — 2500000001 HC RX 250 WO HCPCS SELF ADMINISTERED DRUGS (ALT 637 FOR MEDICARE OP)

## 2025-03-23 PROCEDURE — 2500000002 HC RX 250 W HCPCS SELF ADMINISTERED DRUGS (ALT 637 FOR MEDICARE OP, ALT 636 FOR OP/ED)

## 2025-03-23 PROCEDURE — 1100000001 HC PRIVATE ROOM DAILY

## 2025-03-23 PROCEDURE — 2500000004 HC RX 250 GENERAL PHARMACY W/ HCPCS (ALT 636 FOR OP/ED)

## 2025-03-23 PROCEDURE — 1120000001 HC OB PRIVATE ROOM DAILY

## 2025-03-23 RX ORDER — SIMETHICONE 80 MG
80 TABLET,CHEWABLE ORAL 4 TIMES DAILY
Status: DISCONTINUED | OUTPATIENT
Start: 2025-03-23 | End: 2025-03-25 | Stop reason: HOSPADM

## 2025-03-23 RX ORDER — NIFEDIPINE 30 MG/1
30 TABLET, FILM COATED, EXTENDED RELEASE ORAL
Status: DISCONTINUED | OUTPATIENT
Start: 2025-03-23 | End: 2025-03-24

## 2025-03-23 RX ORDER — DOCUSATE SODIUM 100 MG/1
100 CAPSULE, LIQUID FILLED ORAL 2 TIMES DAILY
Status: DISCONTINUED | OUTPATIENT
Start: 2025-03-23 | End: 2025-03-25 | Stop reason: HOSPADM

## 2025-03-23 RX ORDER — CYCLOBENZAPRINE HCL 10 MG
5 TABLET ORAL 3 TIMES DAILY PRN
Status: DISCONTINUED | OUTPATIENT
Start: 2025-03-23 | End: 2025-03-25 | Stop reason: HOSPADM

## 2025-03-23 RX ORDER — POLYETHYLENE GLYCOL 3350 17 G/17G
17 POWDER, FOR SOLUTION ORAL 2 TIMES DAILY
Status: DISCONTINUED | OUTPATIENT
Start: 2025-03-23 | End: 2025-03-25 | Stop reason: HOSPADM

## 2025-03-23 RX ADMIN — ACETAMINOPHEN 975 MG: 325 TABLET ORAL at 19:19

## 2025-03-23 RX ADMIN — NIFEDIPINE 30 MG: 30 TABLET, FILM COATED, EXTENDED RELEASE ORAL at 13:48

## 2025-03-23 RX ADMIN — ENOXAPARIN SODIUM 40 MG: 100 INJECTION SUBCUTANEOUS at 14:52

## 2025-03-23 RX ADMIN — ACETAMINOPHEN 975 MG: 325 TABLET ORAL at 13:09

## 2025-03-23 RX ADMIN — IBUPROFEN 600 MG: 600 TABLET, FILM COATED ORAL at 13:08

## 2025-03-23 RX ADMIN — IBUPROFEN 600 MG: 600 TABLET, FILM COATED ORAL at 19:19

## 2025-03-23 RX ADMIN — CYCLOBENZAPRINE 5 MG: 10 TABLET, FILM COATED ORAL at 14:53

## 2025-03-23 RX ADMIN — DOCUSATE SODIUM 100 MG: 100 CAPSULE, LIQUID FILLED ORAL at 19:33

## 2025-03-23 ASSESSMENT — PAIN SCALES - GENERAL
PAINLEVEL_OUTOF10: 5 - MODERATE PAIN
PAINLEVEL_OUTOF10: 6
PAINLEVEL_OUTOF10: 0 - NO PAIN
PAINLEVEL_OUTOF10: 7

## 2025-03-23 ASSESSMENT — PAIN DESCRIPTION - LOCATION: LOCATION: COCCYX

## 2025-03-23 NOTE — LACTATION NOTE
Lactation Consultant Note  Lactation Consultation  Reason for Consult: Initial assessment  Consultant Name: ROBERT Bradley RN IBCLC    Maternal Information  Has mother  before?: No  Infant to breast within first 2 hours of birth?: Yes  Exclusive Pump and Bottle Feed: No    Maternal Assessment  Breast Assessment: Medium, Symmetrical, Soft, Compressible  Nipple Assessment: Intact, Erect  [place] Assessment: Normal    Infant Assessment  Infant Behavior: Deep sleep    Feeding Assessment  Latch Assessment: No latch achieved    LATCH TOOL       Breast Pump       Other OB Lactation Tools  Lactation Tools: Lanolin    Patient Follow-up       Other OB Lactation Documentation       Recommendations/Summary    I did not view a latch during this visit. The mother reports having recently breastfeeding and her infant is now sleeping. She feels that the breastfeeding is going well. The mother was assisted by her RN (who is also an IBCLC) during an earlier feeding. We discussed the following breastfeeding topics: early  feeding patterns and behaviors; frequent feeds with goal of 8-12 feeds/24 hours; the benefits of skin to skin for breastfeeding; early milk production; early milk production and release; the importance of a deep latch for maternal comfort and optimal milk production/transfer; alternating starting breast and allowing the infant to end the feeding; and the rationale for delaying pumping (unless clinically indicated) and pacifier use until breastfeeding is well-established. The mother has a breast pump for home use. She was given written information on outpatient lactation services. All questions were answered and the parents are encouraged to call for assistance as needed.

## 2025-03-23 NOTE — CARE PLAN
The patient's goals for the shift include rest and work on breastfeeding baby    The clinical goals for the shift include remain stable VS and assessment, assist with breastfeeding    Over the shift, the patient did not make progress toward the following goals. Barriers to progression include none. Recommendations to address these barriers include none.    Mild range Bps during shift, however, patient was started on nifedipine today. Pt w/o other s/sx of worsening PEC. Assessments WNL> Spouse at bedside supportive of patient and involved in care. Mother and infant bonding well.

## 2025-03-23 NOTE — CARE PLAN
The patient's goals for the shift include establish baby feeding pattern and adequate pain relief    The clinical goals for the shift include light lochia and stable BP's    Patient is progressing through goals. Lochia is light, voiding spontaneous, and stable vital signs.       Problem: Postpartum  Goal: Experiences normal postpartum course  Outcome: Progressing     Problem: Postpartum  Goal: Appropriate maternal -  bonding  Outcome: Progressing     Problem: Postpartum  Goal: Minimal s/sx of HDP and BP<160/110  Outcome: Progressing

## 2025-03-23 NOTE — PROGRESS NOTES
Postpartum Progress Note    Assessment/Plan   Yael Schmidt is a 29 y.o., , who was admitted on 3/21/2025 for IOL in s/o chronic hypertension, delivered at 38w1d gestation via Vaginal, Spontaneous.     Routine Postpartum Care  Now PPD#1 s/p Vaginal, Spontaneous on 3/22/2025  -Meeting postpartum milestones  -Discussed importance of movement and bowel regimen  -Continue routine postpartum care  -Pain well controlled on PO medications  -DVT Score (IF A SCORE IS NOT CALCULATING, MUST SELECT A BMI TO COMPLETE): 5 - encourage ambulation,  SCDs, and  ppx lovenox  -O POS, Rhogam not indicated  - Hgb:   Results from last 7 days   Lab Units 25  1801   HEMOGLOBIN g/dL 11.6*      cHTN  -Persistently high mild range blood pressure's today, started nifedipine 30mg  -HELLP labs negative on admission, P:C 0.12  -Discussed increased risk for pre-eclampsia with history of hypertensive disorder  -Discussed recommendation for 3 day admission for blood pressure monitoring, the patient is agreeable  -BP cuff for home monitoring BID    Coccydynia  -Discussed given fetal position in labor, likely contributing factors  -Discussed use of donut hole pillow, rotating ice and heat  -Reviewed that in some cases flexibility of tailbone can stretch the muscles and ligaments around tailbone too far, causing additional pain  -Flexeril ordered    2nd degree perineal laceration, repaired  -Pain well controlled  -Recommend sitz baths, Tucks, Dermoplast, ice packs, rizwan bottle for comfort  -scheduled bowel regimen    Maternal Well-Being  -Bonding appropriately with baby Justine at bedside.  -Emotional support provided, reviewed baby blues and warning signs, postpartum course anticipatory guidance provided, reviewed normal expectations and maternal warning signs  -Provided family centered care, and addressed all questions and concerns    [place] Feeding  -The patient is breast feeding  -Counseled patient on importance of on demand  feeding or pumping atleast 8-10 times daily to promote adequate supply.  -Breastfeeding/pumping encouraged, lactation consult ordered, PRN    Contraception  - Defers contraception to primary OB/PP visit. We discussed pregnancy spacing of at least one year, abstaining from intercourse for 6wks, and the ability to become pregnant in the absence of regular menses. Pt verbalized understanding.  - Encouraged to continue PNV     Dispo  - Anticipate d/c on PPD #3 pending BP control.  - The signs and symptoms of PEC were reviewed with the patient, including unrelenting headache, vision changes/blurred vision, and pain underneath the right breast.   - BP cuff for home monitoring  - Has follow up visit scheduled with Dr. Green Friday for BP check, and 4-6 weeks for postpartum visit.    DAPHNE Nina-CNP, CLC   03/23/25 1:57 PM  vocera    Assessment & Plan  Encounter for induction of labor    Gastroesophageal reflux disease    Chronic hypertension in pregnancy ([facility])    Pregnancy Problems (from 08/30/24 to present)       Problem Noted Diagnosed Resolved    Encounter for induction of labor 3/21/2025 by Prashant Shin MD  No    Priority:  Medium       36 weeks gestation of pregnancy ([facility]) 8/30/2024 by Abena Green MD  No    Priority:  Medium       Overview Addendum 3/13/2025 11:58 AM by Abena Green MD     Desired provider in labor: [] CNM  [x] Physician  [x] Blood Products: [x] Yes, accepts [] No, needs counseling  [x] Initial BMI: 34  [x] Prenatal Labs: O+, RI, Hep B non immune (Declines vaccine), Hgb 13.0  [x] Cervical Cancer Screening up to date  [x] Rh status: positive  [x] Genetic Screening:  neg CF/SMA/fragile X and rr cfDNA  [x] NT US: (11-13 wks): suboptimal views but no obvious abnormality  [x] Baby ASA (if indicated): Indicated, started   [x] Pregnancy dated by: LMP consistend with an 11 week  ultrasound    [x] Anatomy US: (19-20 wks): completed, normal  [x] Federal Sterilization  consent signed (if indicated): N/A  [x] 1hr GCT at 24-28wks: normal at 107  [x] Rhogam (if indicated): Not indicated  [x] Fetal Surveillance (if indicated): Not indicated  [x] Tdap (27-32 wks, may be given up to 36 wks if initial window missed): Declines  [x] RSV (32-36 wks) (Sept. to end of ): Declines  [x] Flu Vaccine: discussed 24 and declines    [x] Breastfeeding: planning to bf, has spectra pump  [x] Postpartum Birth control method: declines contraception immediately PP, plans for lactational amenorrhea and then possibly IUD vs cOCP in the future  [x] GBS at 36 - 37 wks: negative  [] 39 weeks discussion of IOL vs. Expectant management: cHTN without meds, slightly worsening Bps, recommend induction 37-38 weeks, requested 3/21  [x] Mode of delivery ( anticipated ): , kids in the sun for peds           Chronic hypertension in pregnancy ([facility]) 2024 by Abena Green MD  No    Priority:  Medium       Overview Addendum 3/13/2025 11:59 AM by Abena Green MD     Not on meds  Monitoring Bps at home   Baseline PEC labs normal             Vaginal spotting 2025 by Abena Green MD  3/7/2025 by Abena Green MD    Priority:  Medium       Overview Addendum 2025  3:53 PM by Abena Green MD     No blood noted in vaginal vault on exam. Some discharge concerning for year infection, vaginitis swab collected  NST reactive  Ok to monitor at home. Present to L&D for persistent/worsening symptoms.                 Subjective   The patient's pain is well controlled with current medication regimen. She denies chest pain, cough, shortness of breath, headaches, vision changes, pain under right breast, fever, heavy vaginal bleeding, abdominal pain, dizziness, fatigue, or chills. She denies any breast concerns or emotional concerns at this time.     Yael SONI Roxana is PPD#1 s/p vaginal delivery who reports feeling overall well, but reports significant pain in her  tailbone are. She has tried tylenol, ibuprofen, and lidocaine patches with minimal relief. She is now lying on her side, icing the area.  No acute events overnight. She is ambulating and urinating without difficulty. She is tolerating an adult regular diet, passing flatus.    Objective   Allergies:   Doxycycline         Last Vitals:  Temp Pulse Resp BP MAP Pulse Ox   36.3 °C (97.3 °F) 71 18 (!) 150/88   98 %     Vitals Min/Max Last 24 Hours:  Temp  Min: 36 °C (96.8 °F)  Max: 36.6 °C (97.9 °F)  Pulse  Min: 71  Max: 131  Resp  Min: 16  Max: 20  BP  Min: 135/63  Max: 155/78    Physical Exam:  Constitutional: examination reveals a well developed, well nourished, female, in no acute distress. She is alert, pleasant and cooperative.  Cardiac: warm and well perfused, regular rate, S1, S2 normal, no murmur, click, rub or gallop  Respiratory:  Even and unlabored breathing in room air, clear to auscultation bilaterally. No crackles or wheezes.  Fundus: Firm and below umbilicus  Derm: Skin color, texture, turgor normal. No rashes, or lesions.    Abdominal: soft, non-tender, non-distended, active bowel sounds x4, no masses, no organomegaly  Extremities: Symmetrical, no redness or tenderness in the calves or thighs, no edema or discoloration  Neurological: PERRLA. alert, oriented, normal speech, no focal findings or movement disorder noted.  Psychological: Appropriate mood and affect. Awake and alert; oriented to person, place, and time.   Skin: no rashes or lesions    Lab Data:  Labs in chart were reviewed.  Lab Results   Component Value Date    WBC 15.2 (H) 03/21/2025    HGB 11.6 (L) 03/21/2025    HCT 35.4 (L) 03/21/2025     03/21/2025     Lab Results   Component Value Date    GLUCOSE 84 03/21/2025     03/21/2025    K 3.7 03/21/2025     03/21/2025    CO2 24 03/21/2025    ANIONGAP 13 03/21/2025    BUN 13 03/21/2025    CREATININE 0.69 03/21/2025    EGFR >90 03/21/2025    CALCIUM 8.9 03/21/2025    ALBUMIN 3.2  (L) 03/21/2025    PROT 6.3 (L) 03/21/2025    ALKPHOS 138 (H) 03/21/2025    ALT 9 03/21/2025    AST 8 (L) 03/21/2025    BILITOT 0.2 03/21/2025     0   Lab Value Date/Time    UTPCR 0.10 03/21/2025 1806    UTPCR 0.12 03/10/2025 1341    UTPCR 0.06 08/30/2024 1137

## 2025-03-23 NOTE — ANESTHESIA POSTPROCEDURE EVALUATION
Patient: Yael Schmidt    Procedure Summary       Date: 03/22/25 Room / Location:     Anesthesia Start: 0531 Anesthesia Stop: 1438    Procedure: Labor Analgesia Diagnosis:     Scheduled Providers:  Responsible Provider: Stevan Bernard DO    Anesthesia Type: epidural ASA Status: 3            Anesthesia Type: epidural    Vitals:    03/23/25 0440   BP: 138/80   Pulse: 72   Resp: 18   Temp: 36.5 °C (97.7 °F)   SpO2: 97%           Anesthesia Post Evaluation    Patient location during evaluation: floor  Patient participation: complete - patient participated  Level of consciousness: awake  Pain management: adequate  Airway patency: patent  Cardiovascular status: acceptable  Respiratory status: acceptable  Hydration status: acceptable  Postoperative Nausea and Vomiting: none  Comments: Neuraxial site assessed. No visible redness or swelling or drainage. Patient able to ambulate and move all extremities without difficulty. Able to void. No complaints of nausea/vomiting. Tolerating PO intake well. No s/sx of PDPH.  Pt reports coccyx pain.  Epidural site assessed, no tenderness, not red, not swollen, not hot to touch.  Pt reassured coccyx pain likely from delivery and not related to epidural placement.         No notable events documented.

## 2025-03-24 PROCEDURE — 2500000002 HC RX 250 W HCPCS SELF ADMINISTERED DRUGS (ALT 637 FOR MEDICARE OP, ALT 636 FOR OP/ED)

## 2025-03-24 PROCEDURE — 2500000004 HC RX 250 GENERAL PHARMACY W/ HCPCS (ALT 636 FOR OP/ED)

## 2025-03-24 PROCEDURE — 2500000001 HC RX 250 WO HCPCS SELF ADMINISTERED DRUGS (ALT 637 FOR MEDICARE OP)

## 2025-03-24 PROCEDURE — 1120000001 HC OB PRIVATE ROOM DAILY

## 2025-03-24 PROCEDURE — 2500000002 HC RX 250 W HCPCS SELF ADMINISTERED DRUGS (ALT 637 FOR MEDICARE OP, ALT 636 FOR OP/ED): Performed by: NURSE PRACTITIONER

## 2025-03-24 PROCEDURE — 1100000001 HC PRIVATE ROOM DAILY

## 2025-03-24 RX ORDER — NIFEDIPINE 30 MG/1
30 TABLET, FILM COATED, EXTENDED RELEASE ORAL ONCE
Status: COMPLETED | OUTPATIENT
Start: 2025-03-24 | End: 2025-03-24

## 2025-03-24 RX ORDER — NIFEDIPINE 60 MG/1
60 TABLET, FILM COATED, EXTENDED RELEASE ORAL
Status: DISCONTINUED | OUTPATIENT
Start: 2025-03-25 | End: 2025-03-25

## 2025-03-24 RX ADMIN — NIFEDIPINE 30 MG: 30 TABLET, FILM COATED, EXTENDED RELEASE ORAL at 15:57

## 2025-03-24 RX ADMIN — DOCUSATE SODIUM 100 MG: 100 CAPSULE, LIQUID FILLED ORAL at 20:16

## 2025-03-24 RX ADMIN — ACETAMINOPHEN 975 MG: 325 TABLET ORAL at 07:13

## 2025-03-24 RX ADMIN — IBUPROFEN 600 MG: 600 TABLET, FILM COATED ORAL at 12:26

## 2025-03-24 RX ADMIN — ACETAMINOPHEN 975 MG: 325 TABLET ORAL at 12:26

## 2025-03-24 RX ADMIN — DOCUSATE SODIUM 100 MG: 100 CAPSULE, LIQUID FILLED ORAL at 09:26

## 2025-03-24 RX ADMIN — NIFEDIPINE 30 MG: 30 TABLET, FILM COATED, EXTENDED RELEASE ORAL at 07:14

## 2025-03-24 RX ADMIN — IBUPROFEN 600 MG: 600 TABLET, FILM COATED ORAL at 07:14

## 2025-03-24 RX ADMIN — ENOXAPARIN SODIUM 40 MG: 100 INJECTION SUBCUTANEOUS at 15:58

## 2025-03-24 ASSESSMENT — PAIN SCALES - GENERAL
PAINLEVEL_OUTOF10: 0 - NO PAIN
PAINLEVEL_OUTOF10: 5 - MODERATE PAIN
PAINLEVEL_OUTOF10: 4
PAINLEVEL_OUTOF10: 0 - NO PAIN
PAINLEVEL_OUTOF10: 0 - NO PAIN
PAINLEVEL_OUTOF10: 1

## 2025-03-24 NOTE — PROGRESS NOTES
Postpartum Progress Note    Assessment/Plan   Yael Schmidt is a 29 y.o., , who delivered at 38w1d gestation    Now PPD#2 s/p Vaginal, Spontaneous on 3/22/2025  - Continue routine postpartum care  - Pain well controlled on po medications  - DVT risk score DVT Score (IF A SCORE IS NOT CALCULATING, MUST SELECT A BMI TO COMPLETE): 5 , ppx with SCDs, ambulation, and lovenox  - RH positive, rhogam not indicated  - Hgb:   Results from last 7 days   Lab Units 25  1801   HEMOGLOBIN g/dL 11.6*        cHTN  -Persistently high mild range blood pressure's today, nifedipine 30->60   -HELLP labs negative on admission, P:C 0.12  -Discussed increased risk for pre-eclampsia with history of hypertensive disorder  -Discussed recommendation for 3 day admission for blood pressure monitoring, the patient is agreeable  -BP cuff for home monitoring BID     Coccydynia  -Discussed given fetal position in labor, likely contributing factors  -Discussed use of donut hole pillow, rotating ice and heat  -Reviewed that in some cases flexibility of tailbone can stretch the muscles and ligaments around tailbone too far, causing additional pain  -Flexeril ordered     2nd degree perineal laceration, repaired  -Pain well controlled  -Recommend sitz baths, Tucks, Dermoplast, ice packs, rizwan bottle for comfort  -scheduled bowel regimen     Maternal Well-Being  -Bonding appropriately with baby Justine at bedside.  -Emotional support provided, reviewed baby blues and warning signs, postpartum course anticipatory guidance provided, reviewed normal expectations and maternal warning signs  -Provided family centered care, and addressed all questions and concerns     [place] Feeding  -The patient is breast feeding  -Counseled patient on importance of on demand feeding or pumping atleast 8-10 times daily to promote adequate supply.  -Breastfeeding/pumping encouraged, lactation consult ordered, PRN     Contraception  - Defers contraception to  primary OB/PP visit. We discussed pregnancy spacing of at least one year, abstaining from intercourse for 6wks, and the ability to become pregnant in the absence of regular menses. Pt verbalized understanding.  - Encouraged to continue PNV      Dispo  - Anticipate d/c on PPD #3 pending BP control.  - The signs and symptoms of PEC were reviewed with the patient, including unrelenting headache, vision changes/blurred vision, and pain underneath the right breast.   - BP cuff for home monitoring  - Has follow up visit scheduled with Dr. Tee Friday for BP check, and 4-6 weeks for postpartum visit.      DAPHNE Stafford-CNP     Assessment & Plan  Encounter for induction of labor    Gastroesophageal reflux disease    Chronic hypertension in pregnancy ([facility])    Pregnancy Problems (from 08/30/24 to present)       Problem Noted Diagnosed Resolved    Encounter for induction of labor 3/21/2025 by Prashant Shin MD  No    Priority:  Medium       36 weeks gestation of pregnancy ([facility]) 8/30/2024 by Abena Tee MD  No    Priority:  Medium       Overview Addendum 3/13/2025 11:58 AM by Abena Tee MD     Desired provider in labor: [] CNM  [x] Physician  [x] Blood Products: [x] Yes, accepts [] No, needs counseling  [x] Initial BMI: 34  [x] Prenatal Labs: O+, RI, Hep B non immune (Declines vaccine), Hgb 13.0  [x] Cervical Cancer Screening up to date  [x] Rh status: positive  [x] Genetic Screening:  neg CF/SMA/fragile X and rr cfDNA  [x] NT US: (11-13 wks): suboptimal views but no obvious abnormality  [x] Baby ASA (if indicated): Indicated, started   [x] Pregnancy dated by: LMP consistend with an 11 week  ultrasound    [x] Anatomy US: (19-20 wks): completed, normal  [x] Federal Sterilization consent signed (if indicated): N/A  [x] 1hr GCT at 24-28wks: normal at 107  [x] Rhogam (if indicated): Not indicated  [x] Fetal Surveillance (if indicated): Not indicated  [x] Tdap (27-32 wks, may be given up to  36 wks if initial window missed): Declines  [x] RSV (32-36 wks) (Sept. to end of ): Declines  [x] Flu Vaccine: discussed 24 and declines    [x] Breastfeeding: planning to bf, has spectra pump  [x] Postpartum Birth control method: declines contraception immediately PP, plans for lactational amenorrhea and then possibly IUD vs cOCP in the future  [x] GBS at 36 - 37 wks: negative  [] 39 weeks discussion of IOL vs. Expectant management: cHTN without meds, slightly worsening Bps, recommend induction 37-38 weeks, requested 3/21  [x] Mode of delivery ( anticipated ): , kids in the sun for peds           Chronic hypertension in pregnancy ([facility]) 2024 by Abena Green MD  No    Priority:  Medium       Overview Addendum 3/23/2025  2:20 PM by Macarena Espinosa, DAPHNE-CNP     Started Nifed 30mg for persistently high mild blood pressure's on PPD#1, previously on no medications  BP cuff for home monitoring  Baseline PEC labs normal             Vaginal spotting 2025 by Abena Green MD  3/7/2025 by Abena Green MD    Priority:  Medium       Overview Addendum 2025  3:53 PM by Abena Green MD     No blood noted in vaginal vault on exam. Some discharge concerning for year infection, vaginitis swab collected  NST reactive  Ok to monitor at home. Present to L&D for persistent/worsening symptoms.                 Subjective     Yael Schmidt is PPD#2 s/p vaginal delivery who reports feeling overall well.  No acute events overnight.  Voiding spontaneously, passing flatus.  Pain well controlled on PO meds.  Light lochia. Tolerating diet.  Denies HA, CP, SOB, RUQ pain, vision changes or N/V. Denies dizziness, lightheadedness, LOC, or uncontrolled bleeding.  Pt. Wanted dc today, upset/tearful, discussed risk of readmission high.  Agreed to increasing nifedipine to 60mgxl daily for last bp  of 159/92.  Wants recheck at  and potential dc.    Objective   Allergies:    Doxycycline         Last Vitals:  Temp Pulse Resp BP MAP Pulse Ox   36.6 °C (97.9 °F) 82 16 139/83   96 %     Vitals Min/Max Last 24 Hours:  Temp  Min: 36 °C (96.8 °F)  Max: 36.6 °C (97.9 °F)  Pulse  Min: 64  Max: 86  Resp  Min: 16  Max: 17  BP  Min: 134/83  Max: 143/86    Intake/Output:   No intake or output data in the 24 hours ending 03/24/25 9256    Physical Exam:  General: examination reveals a well developed, well nourished, female, in no acute distress. She is alert and cooperative.  HEENT: external ears normal. Nose normal, no erythema or discharge.  Neck: supple, no significant adenopathy  Lungs: breathing even and unlabored, lungs clear  Cardiac: warm and well perfused, heart rate regular  Fundus: firm and below umbilicus, lochia light  Abdominal: soft, non-tender, non-distended, bowel sounds active  Extremities: no redness or tenderness in the calves or thighs, edema: non-pitting BLE  Neurological: alert, oriented, normal speech, no focal findings or movement disorder noted.  Psychological: awake and alert; oriented to person, place, and time.  Skin: no rashes or lesions    Lab Data:  Lab Results   Component Value Date    WBC 15.2 (H) 03/21/2025    HGB 11.6 (L) 03/21/2025    HCT 35.4 (L) 03/21/2025     03/21/2025     Lab Results   Component Value Date    GLUCOSE 84 03/21/2025     03/21/2025    K 3.7 03/21/2025     03/21/2025    CO2 24 03/21/2025    ANIONGAP 13 03/21/2025    BUN 13 03/21/2025    CREATININE 0.69 03/21/2025    EGFR >90 03/21/2025    CALCIUM 8.9 03/21/2025    ALBUMIN 3.2 (L) 03/21/2025    PROT 6.3 (L) 03/21/2025    ALKPHOS 138 (H) 03/21/2025    ALT 9 03/21/2025    AST 8 (L) 03/21/2025    BILITOT 0.2 03/21/2025

## 2025-03-24 NOTE — SIGNIFICANT EVENT
Patient meets criteria for home monitoring of blood pressure post discharge.  Reason:  history of chronic hypertension. Met with patient to assess for availability of home BP monitor.  Patient stated she owns home BP monitor.  Patient educated on importance of continuing to monitor BP at home, recording BP on home monitoring log and s/sx of when to call her provider.  Pt verbalized understanding the above information.

## 2025-03-25 VITALS
TEMPERATURE: 97.5 F | WEIGHT: 235.89 LBS | OXYGEN SATURATION: 97 % | RESPIRATION RATE: 18 BRPM | DIASTOLIC BLOOD PRESSURE: 100 MMHG | SYSTOLIC BLOOD PRESSURE: 153 MMHG | BODY MASS INDEX: 37.91 KG/M2 | HEIGHT: 66 IN | HEART RATE: 71 BPM

## 2025-03-25 PROCEDURE — 2500000002 HC RX 250 W HCPCS SELF ADMINISTERED DRUGS (ALT 637 FOR MEDICARE OP, ALT 636 FOR OP/ED): Performed by: NURSE PRACTITIONER

## 2025-03-25 PROCEDURE — 99238 HOSP IP/OBS DSCHRG MGMT 30/<: CPT | Performed by: NURSE PRACTITIONER

## 2025-03-25 PROCEDURE — 2500000001 HC RX 250 WO HCPCS SELF ADMINISTERED DRUGS (ALT 637 FOR MEDICARE OP)

## 2025-03-25 RX ORDER — NIFEDIPINE 90 MG/1
90 TABLET, EXTENDED RELEASE ORAL
Qty: 30 TABLET | Refills: 3 | Status: SHIPPED | OUTPATIENT
Start: 2025-03-26

## 2025-03-25 RX ORDER — ACETAMINOPHEN 325 MG/1
975 TABLET ORAL EVERY 6 HOURS
Qty: 90 TABLET | Refills: 0 | Status: SHIPPED | OUTPATIENT
Start: 2025-03-25

## 2025-03-25 RX ORDER — NIFEDIPINE 60 MG/1
60 TABLET, FILM COATED, EXTENDED RELEASE ORAL
Qty: 30 TABLET | Refills: 3 | Status: SHIPPED | OUTPATIENT
Start: 2025-03-26 | End: 2025-03-25 | Stop reason: HOSPADM

## 2025-03-25 RX ORDER — DOCUSATE SODIUM 100 MG/1
100 CAPSULE, LIQUID FILLED ORAL 2 TIMES DAILY
Qty: 60 CAPSULE | Refills: 0 | Status: SHIPPED | OUTPATIENT
Start: 2025-03-25

## 2025-03-25 RX ORDER — IBUPROFEN 600 MG/1
600 TABLET ORAL EVERY 6 HOURS
Qty: 30 TABLET | Refills: 0 | Status: SHIPPED | OUTPATIENT
Start: 2025-03-25

## 2025-03-25 RX ORDER — NIFEDIPINE 90 MG/1
90 TABLET, EXTENDED RELEASE ORAL
Status: DISCONTINUED | OUTPATIENT
Start: 2025-03-26 | End: 2025-03-25 | Stop reason: HOSPADM

## 2025-03-25 RX ORDER — NIFEDIPINE 30 MG/1
30 TABLET, FILM COATED, EXTENDED RELEASE ORAL ONCE
Status: COMPLETED | OUTPATIENT
Start: 2025-03-25 | End: 2025-03-25

## 2025-03-25 RX ADMIN — NIFEDIPINE 60 MG: 60 TABLET, FILM COATED, EXTENDED RELEASE ORAL at 07:00

## 2025-03-25 RX ADMIN — NIFEDIPINE 30 MG: 30 TABLET, FILM COATED, EXTENDED RELEASE ORAL at 08:34

## 2025-03-25 RX ADMIN — DOCUSATE SODIUM 100 MG: 100 CAPSULE, LIQUID FILLED ORAL at 08:34

## 2025-03-25 ASSESSMENT — PAIN SCALES - GENERAL: PAINLEVEL_OUTOF10: 0 - NO PAIN

## 2025-03-25 NOTE — DISCHARGE SUMMARY
Discharge Summary    Admission Date: 3/21/2025  Discharge Date: 03/25/25      Discharge Diagnosis  Encounter for induction of labor    Hospital Course  Delivery Date: 3/22/2025 2:38 PM  Delivery type: Vaginal, Spontaneous   GA at delivery: 38w1d  Outcome: Living  Anesthesia during delivery: Epidural  Intrapartum complications: None  Feeding method: Breastfeeding Status: Yes     Procedures: none  Contraception at discharge: none  Denies any sx. Of tn.  Reviewed sx. Of tn to return prn.  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  Wants dc today.  Home on nifed 90, fu Friday     Last Vitals:  Temp Pulse Resp BP MAP Pulse Ox   36.4 °C (97.5 °F) 71 18 (!) 153/100 118 97 %     Discharge Meds     Your medication list        START taking these medications        Instructions Last Dose Given Next Dose Due   acetaminophen 325 mg tablet  Commonly known as: Tylenol      Take 3 tablets (975 mg) by mouth every 6 hours.       docusate sodium 100 mg capsule  Commonly known as: Colace      Take 1 capsule (100 mg) by mouth 2 times a day.       ibuprofen 600 mg tablet      Take 1 tablet (600 mg) by mouth every 6 hours.       NIFEdipine ER 90 mg 24 hr tablet  Commonly known as: Adalat CC  Start taking on: March 26, 2025      Take 1 tablet (90 mg) by mouth once daily in the morning. Take before meals. Do not crush, chew, or split. Do not fill before March 26, 2025.              STOP taking these medications      aspirin 81 mg EC tablet        PRENATA ORAL                  Where to Get Your Medications        These medications were sent to [facility] [address] [place], [place] [address] [address] [address]      Phone: [phone]   acetaminophen 325 mg tablet  docusate sodium 100 mg capsule  ibuprofen 600 mg tablet  NIFEdipine ER 90 mg 24 hr tablet          Complications Requiring Follow-Up  Heavy vaginal bleeding, passing clots, fever and/or chills      Test Results Pending At Discharge  Pending  Labs       Order Current Status    Surgical Pathology Exam - PLACENTA In process            Outpatient Follow-Up  Future Appointments   Date Time Provider Department Center   3/28/2025 11:15 AM Abena Green MD [id number] [facility]   4/24/2025  3:45 PM Abena Green MD [id number] [facility]       I spent 15 minutes in the professional and overall care of this patient.      Maddy Nieto, APRN-CNP

## 2025-03-25 NOTE — CARE PLAN
The patient's goals for the shift include discharge    The clinical goals for the shift include bp remains below 140/90    Pt with increased BP this morning, nifedipine uptitrated. Pt desires discharge today, warning signs given.  Problem: Postpartum  Goal: Experiences normal postpartum course  Outcome: Adequate for Discharge  Goal: Appropriate maternal -  bonding  Outcome: Adequate for Discharge  Goal: Establish and maintain infant feeding pattern for adequate nutrition  Outcome: Adequate for Discharge  Goal: Incisions, wounds, or drain sites healing without S/S of infection  Outcome: Adequate for Discharge  Goal: No s/sx infection  Outcome: Adequate for Discharge  Goal: No s/sx of hemorrhage  Outcome: Adequate for Discharge  Goal: Minimal s/sx of HDP and BP<160/110  Outcome: Adequate for Discharge     Problem: Hypertensive Disorder of Pregnancy (HDP)  Goal: Minimal s/sx of HDP and BP<160/110  Outcome: Adequate for Discharge  Goal: Adequate urine output (0.5 ml/kg/hr)  Outcome: Adequate for Discharge     Problem: Discharge Planning  Goal: Discharge to home or other facility with appropriate resources  Outcome: Adequate for Discharge

## 2025-03-25 NOTE — CARE PLAN
The patient's goals for the shift include maintain infant feeding pattern and rest    The clinical goals for the shift include stable BP's over shift    Patient is progressing through goals. Lochia is light, pain is well controlled, and vital signs are stable.       Problem: Postpartum  Goal: Experiences normal postpartum course  Outcome: Progressing     Problem: Postpartum  Goal: Appropriate maternal -  bonding  Outcome: Progressing     Problem: Postpartum  Goal: Establish and maintain infant feeding pattern for adequate nutrition  Outcome: Progressing     Problem: Postpartum  Goal: Minimal s/sx of HDP and BP<160/110  Outcome: Progressing

## 2025-03-27 ENCOUNTER — TELEPHONE (OUTPATIENT)
Dept: OBSTETRICS AND GYNECOLOGY | Facility: CLINIC | Age: 30
End: 2025-03-27
Payer: COMMERCIAL

## 2025-03-27 NOTE — LACTATION NOTE
This note was copied from a baby's chart.  Lactation Consultant Note  Lactation Consultation   Lavinia Funes, RN, IBCLC    Recommendations/Summary  Briefly met with mom at infant's bedside to introduce the availability of the [facility] lactation service. Mom says that she has been directly breastfeeding mostly but has pumped a few times. Is familiar with the [facility] [employer] setup and brought all pumping kit supplies including sterilization bag. Bedside RN is obtaining the [facility] [employer] breast pump and bottles for the family to use.  During the consult baby cueing to eat and mom confidently picked baby up and began nursing appropriately. Says she was off to a very good start with nursing and feels well informed from her education in the hospital post partum. Encouraged mom to continue directly breastfeeding as allowed per the medical team due to the hyperbili diagnosis and to consider pumping after the feed to ensure she has enough to supplement if needed.  Invited to reach out to lactation consultant as questions/concerns arise.

## 2025-03-27 NOTE — TELEPHONE ENCOUNTER
Called patient. Patient denies having any visual changes, epigastric pain, swelling, or headache. Patient stated L&D anything over 160s/90s or if she has any symptoms she needs to go to see them. Patient reschedule for Monday for BP check. Patient is currently with  who has been admitted into the hospital. Patient informed to call with questions or concerns.

## 2025-03-28 ENCOUNTER — APPOINTMENT (OUTPATIENT)
Dept: OBSTETRICS AND GYNECOLOGY | Facility: CLINIC | Age: 30
End: 2025-03-28
Payer: COMMERCIAL

## 2025-03-31 ENCOUNTER — APPOINTMENT (OUTPATIENT)
Dept: OBSTETRICS AND GYNECOLOGY | Facility: CLINIC | Age: 30
End: 2025-03-31
Payer: COMMERCIAL

## 2025-03-31 VITALS — DIASTOLIC BLOOD PRESSURE: 90 MMHG | SYSTOLIC BLOOD PRESSURE: 142 MMHG

## 2025-03-31 DIAGNOSIS — Z01.30 BLOOD PRESSURE CHECK: ICD-10-CM

## 2025-03-31 PROCEDURE — 3077F SYST BP >= 140 MM HG: CPT | Performed by: OBSTETRICS & GYNECOLOGY

## 2025-03-31 PROCEDURE — 99211 OFF/OP EST MAY X REQ PHY/QHP: CPT | Performed by: OBSTETRICS & GYNECOLOGY

## 2025-03-31 PROCEDURE — 3080F DIAST BP >= 90 MM HG: CPT | Performed by: OBSTETRICS & GYNECOLOGY

## 2025-03-31 NOTE — PROGRESS NOTES
Patient identified by name and   Patient had a vaginal delivery 3/22/2025. Patient has complaint of small headache today. Did not get any sleep last night. Patient denies lightheadedness, dizziness, blurred vision, or pain in the upper right quadrant.  Patient continues to take medication as prescribed NIFEdipine ER (Adalat CC) 90 mg 24 hr tablet   Patient is currently doing breastfeeding and pumping with supplement of formula     Patient states blood pressure higher in the morning 140s /130s and then at night lower lowest has been 99/68.     Patient appears well and overall in a good condition  Patient encouraged to continue to take her medications as ordered  Patient scheduled for PP visit 2025  Patient verbalized understanding and all questions were answered.      Patient blood pressure at visit was 142/90. Discussed this over with Dr. Green. Patient is currently on NIFEdipine ER (Adalat CC) 90 mg 24 hr tablet. Dr. Green would like patient to take 45 mg in the morning and 45 mg at night (splitting pill in half) since blood pressures are trend downwards throughout the day. Dr. Green is okay with patient splitting pill in half. Patient informed of what Dr. Green advised.    Discussed with patient signs and symptoms/ when to report to labor and delivery.

## 2025-04-01 LAB
LABORATORY COMMENT REPORT: NORMAL
PATH REPORT.FINAL DX SPEC: NORMAL
PATH REPORT.GROSS SPEC: NORMAL
PATH REPORT.RELEVANT HX SPEC: NORMAL
PATH REPORT.TOTAL CANCER: NORMAL

## 2025-04-03 DIAGNOSIS — M54.2 NECK PAIN: Primary | ICD-10-CM

## 2025-04-03 RX ORDER — CYCLOBENZAPRINE HCL 5 MG
5 TABLET ORAL 3 TIMES DAILY
Qty: 30 TABLET | Refills: 0 | Status: SHIPPED | OUTPATIENT
Start: 2025-04-03 | End: 2025-04-13

## 2025-04-24 ENCOUNTER — APPOINTMENT (OUTPATIENT)
Dept: OBSTETRICS AND GYNECOLOGY | Facility: CLINIC | Age: 30
End: 2025-04-24
Payer: COMMERCIAL

## 2025-04-24 VITALS
SYSTOLIC BLOOD PRESSURE: 138 MMHG | HEIGHT: 66 IN | DIASTOLIC BLOOD PRESSURE: 82 MMHG | WEIGHT: 212 LBS | BODY MASS INDEX: 34.07 KG/M2

## 2025-04-24 DIAGNOSIS — O10.919 CHRONIC HYPERTENSION IN PREGNANCY (HHS-HCC): ICD-10-CM

## 2025-04-24 DIAGNOSIS — Z30.430 ENCOUNTER FOR INSERTION OF COPPER IUD: ICD-10-CM

## 2025-04-24 ASSESSMENT — EDINBURGH POSTNATAL DEPRESSION SCALE (EPDS)
I HAVE BEEN ABLE TO LAUGH AND SEE THE FUNNY SIDE OF THINGS: AS MUCH AS I ALWAYS COULD
I HAVE BEEN SO UNHAPPY THAT I HAVE HAD DIFFICULTY SLEEPING: NOT AT ALL
TOTAL SCORE: 0
I HAVE FELT SCARED OR PANICKY FOR NO GOOD REASON: NO, NOT AT ALL
THE THOUGHT OF HARMING MYSELF HAS OCCURRED TO ME: NEVER
I HAVE BEEN SO UNHAPPY THAT I HAVE BEEN CRYING: NO, NEVER
I HAVE BLAMED MYSELF UNNECESSARILY WHEN THINGS WENT WRONG: NO, NEVER
THINGS HAVE BEEN GETTING ON TOP OF ME: NO, I HAVE BEEN COPING AS WELL AS EVER
I HAVE FELT SAD OR MISERABLE: NO, NOT AT ALL
I HAVE LOOKED FORWARD WITH ENJOYMENT TO THINGS: AS MUCH AS I EVER DID
I HAVE BEEN ANXIOUS OR WORRIED FOR NO GOOD REASON: NO, NOT AT ALL

## 2025-04-24 ASSESSMENT — PAIN SCALES - GENERAL: PAINLEVEL_OUTOF10: 0-NO PAIN

## 2025-04-24 NOTE — PROGRESS NOTES
"Assessment   IMPRESSIONS:  1. Encounter for postpartum visit (Primary)  - doing well, no concerns    2. Chronic hypertension in pregnancy ([facility])  - Bps normal on nifedipine. Not on medications prior to pregnancy.   - discontinue medication and monitor off meds  - recommend establishing with PCP to see if she should be on medication outside of pregnancy    3. Encounter for insertion of copper IUD  - IUD inserted without difficulty    Follow up for preventative visit in 6-12 months or sooner PRN    Abena Green MD    Subjective   29 y.o.  presenting for postpartum follow-up     Delivery Date: 3/22/25  GA at Delivery: 38w1d  Type of Delivery:     Pregnancy/delivery notable for: cHTN    Concerns: some discomfort over perineal laceration    Pap NILM 3/2024  Pain: controlled  Lacerations: second degree laceration  Lochia: minimal  Menses: Not yet  Sexual Intimacy: not yet  Contraceptive Method: Desires copper IUD insertion today   Feeding Method: mixed, breast and formula feeding  Lactation Consult Needed?: no  Bonding with Baby: well   Mood:   good, denies concerns      PHYSICAL EXAM  Objective   /82   Ht 1.676 m (5' 6\")   Wt 96.2 kg (212 lb)   LMP 2024 (Approximate)   Breastfeeding Yes   BMI 34.22 kg/m²      General:   Alert and oriented, in no acute distress               Vulva: Normal architecture without erythema, masses, or lesions.    Vagina: Slight mucosal separation at laceration site, suture material present   Cervix: Normal without masses, lesions, or signs of cervicitis.    Uterus: Normal mobile, non-enlarged uterus    Adnexa: Normal without masses or lesions             Patient ID: Yael Schmidt is a 29 y.o. female.    IUD Management    Performed by: Abena Green MD  Authorized by: Abena Green MD    Procedure: IUD insertion    Consent obtained by patient, parent, or legal power of  - including discussion of procedure risks and benefits, " patient questions answered, and patient education provided: yes    Pregnancy risk: reasonably certain the patient is not pregnant    Date/Time of Insertion:  4/24/2025 3:41 PM  Speculum placed in vagina: yes    Cervix cleaned and prepped: yes    Tenaculum/Allis/Ring Forceps applied to cervix: yes    Anesthesia used: no    Uterus sound depth (cm):  9  Cervix manually dilated: no    IUD inserted without complications: yes    OSM: copper 380 square mm  Strings trimmed to (cm):  3  Patient tolerated procedure well: yes    Intended removal date: 10 years

## 2025-06-13 ENCOUNTER — TELEPHONE (OUTPATIENT)
Dept: OBSTETRICS AND GYNECOLOGY | Facility: CLINIC | Age: 30
End: 2025-06-13
Payer: COMMERCIAL

## 2025-06-13 NOTE — TELEPHONE ENCOUNTER
Pt contacted by phone to follow up on the [employer] message radha.  Vag delivery on 3/22/25.  Medically cleared to RTW 6 weeks PP but choosing to RTW 6/16/25.  RTW note faxed to [facility] today and also emailed to pt.